# Patient Record
Sex: MALE | Race: WHITE | NOT HISPANIC OR LATINO | Employment: FULL TIME | URBAN - METROPOLITAN AREA
[De-identification: names, ages, dates, MRNs, and addresses within clinical notes are randomized per-mention and may not be internally consistent; named-entity substitution may affect disease eponyms.]

---

## 2021-04-14 DIAGNOSIS — Z23 ENCOUNTER FOR IMMUNIZATION: ICD-10-CM

## 2023-11-08 ENCOUNTER — OFFICE VISIT (OUTPATIENT)
Dept: FAMILY MEDICINE CLINIC | Facility: CLINIC | Age: 43
End: 2023-11-08
Payer: COMMERCIAL

## 2023-11-08 VITALS
HEART RATE: 71 BPM | TEMPERATURE: 99.1 F | HEIGHT: 71 IN | DIASTOLIC BLOOD PRESSURE: 80 MMHG | WEIGHT: 233 LBS | OXYGEN SATURATION: 96 % | SYSTOLIC BLOOD PRESSURE: 110 MMHG | BODY MASS INDEX: 32.62 KG/M2

## 2023-11-08 DIAGNOSIS — Z00.00 WELL ADULT EXAM: Primary | ICD-10-CM

## 2023-11-08 DIAGNOSIS — Z13.0 SCREENING FOR DEFICIENCY ANEMIA: ICD-10-CM

## 2023-11-08 DIAGNOSIS — R09.81 CHRONIC NASAL CONGESTION: ICD-10-CM

## 2023-11-08 DIAGNOSIS — J45.20 MILD INTERMITTENT ASTHMA WITHOUT COMPLICATION: ICD-10-CM

## 2023-11-08 DIAGNOSIS — L65.9 HAIR LOSS: ICD-10-CM

## 2023-11-08 DIAGNOSIS — Z13.228 SCREENING FOR ENDOCRINE, METABOLIC AND IMMUNITY DISORDER: ICD-10-CM

## 2023-11-08 DIAGNOSIS — T78.40XA ALLERGY, INITIAL ENCOUNTER: ICD-10-CM

## 2023-11-08 DIAGNOSIS — Z13.29 SCREENING FOR ENDOCRINE, METABOLIC AND IMMUNITY DISORDER: ICD-10-CM

## 2023-11-08 DIAGNOSIS — Z13.1 SCREENING FOR DIABETES MELLITUS (DM): ICD-10-CM

## 2023-11-08 DIAGNOSIS — Z13.0 SCREENING FOR ENDOCRINE, METABOLIC AND IMMUNITY DISORDER: ICD-10-CM

## 2023-11-08 DIAGNOSIS — Z13.220 SCREENING FOR LIPID DISORDERS: ICD-10-CM

## 2023-11-08 DIAGNOSIS — Z11.59 NEED FOR HEPATITIS C SCREENING TEST: ICD-10-CM

## 2023-11-08 PROCEDURE — 99203 OFFICE O/P NEW LOW 30 MIN: CPT | Performed by: FAMILY MEDICINE

## 2023-11-08 PROCEDURE — 99386 PREV VISIT NEW AGE 40-64: CPT | Performed by: FAMILY MEDICINE

## 2023-11-08 RX ORDER — FINASTERIDE 1 MG/1
1 TABLET, FILM COATED ORAL DAILY
Qty: 90 TABLET | Refills: 1 | Status: SHIPPED | OUTPATIENT
Start: 2023-11-08

## 2023-11-08 RX ORDER — ALBUTEROL SULFATE 90 UG/1
2 AEROSOL, METERED RESPIRATORY (INHALATION) EVERY 6 HOURS PRN
Qty: 6.7 G | Refills: 1 | Status: SHIPPED | OUTPATIENT
Start: 2023-11-08

## 2023-11-08 NOTE — PROGRESS NOTES
[address]    NAME: Chica Collazo  AGE: 44 y.o. SEX: male  : 1980     DATE: 12/3/2023     Assessment and Plan:     Problem List Items Addressed This Visit          Respiratory    Mild intermittent asthma without complication     Renewal of albuterol          Relevant Medications    albuterol (Proventil HFA) 90 mcg/act inhaler       Other    Allergies    Relevant Orders    Respiratory Allergy Profile Region I (Completed)    Chronic nasal congestion     Ent evaluation. Discussed stopping afrin- rebound congestion contributing to problem. Continue with zyrtec, sudafed and flonase         Relevant Orders    Ambulatory Referral to Otolaryngology    Well adult exam - Primary    Hair loss     Discussed treatment options. Propecia 1 tab daily          Relevant Medications    finasteride (PROPECIA) 1 MG tablet    Other Relevant Orders    Testosterone (Completed)     Other Visit Diagnoses       Screening for deficiency anemia        Relevant Orders    CBC and differential (Completed)    Screening for diabetes mellitus (DM)        Relevant Orders    Comprehensive metabolic panel (Completed)    Screening for endocrine, metabolic and immunity disorder        Relevant Orders    Comprehensive metabolic panel (Completed)    TSH, 3rd generation with Free T4 reflex (Completed)    Screening for lipid disorders        Relevant Orders    Lipid Panel with Direct LDL reflex (Completed)    Need for hepatitis C screening test        Relevant Orders    Hepatitis C Qualitative by PCR ([facility] ONLY)            Immunizations and preventive care screenings were discussed with patient today. Appropriate education was printed on patient's after visit summary. Counseling:  Dental Health: discussed importance of regular tooth brushing, flossing, and dental visits. Exercise: the importance of regular exercise/physical activity was discussed.  Recommend exercise 3-5 times per week for at least 30 minutes. BMI Counseling: Body mass index is 32.5 kg/m². The BMI is above normal. Nutrition recommendations include decreasing portion sizes. Exercise recommendations include exercising 3-5 times per week. No pharmacotherapy was ordered. Rationale for BMI follow-up plan is due to patient being overweight or obese. Depression Screening and Follow-up Plan: Patient was screened for depression during today's encounter. They screened negative with a PHQ-2 score of 0. No follow-ups on file. Chief Complaint:     Chief Complaint   Patient presents with    [facility] [place] [facility]     Np- physical check up   Pend bw   Hair loss and weight loss   Allergies and sinus congested always has sinus issues and nose been stuffy and sinus pressure been taken allergy meds zyrtec been using sudafed wake  up stuffy nose when he runs he bleeds out his mouth,   Renew his inhaler        History of Present Illness:     Adult Annual Physical   Patient here for a comprehensive physical exam. The patient reports problems - chronic nasal congestion, ongoing knee pain . Diet and Physical Activity  Diet/Nutrition: well balanced diet. Exercise: walking. Depression Screening  PHQ-2/9 Depression Screening    Little interest or pleasure in doing things: 0 - not at all  Feeling down, depressed, or hopeless: 0 - not at all  PHQ-2 Score: 0  PHQ-2 Interpretation: Negative depression screen       General Health  Sleep: sleeps well. Hearing: decreased - bilateral.  Vision: wears glasses. Dental: regular dental visits.  Health  Symptoms include: none    Advanced Care Planning  Do you have an advanced directive? no  Do you have a durable medical power of ? no     Review of Systems:     Review of Systems   Constitutional: Negative. Negative for fatigue and fever. HENT:  Positive for congestion. Eyes: Negative. Respiratory: Negative. Negative for cough. Cardiovascular: Negative. Gastrointestinal: Negative. Endocrine: Negative. Genitourinary: Negative. Musculoskeletal: Negative. Skin: Negative. Allergic/Immunologic: Negative. Neurological: Negative. Psychiatric/Behavioral: Negative. Past Medical History:     Past Medical History:   Diagnosis Date    Allergic     Asthma       Past Surgical History:     Past Surgical History:   Procedure Laterality Date    ELBOW SURGERY        Family History:     Family History   Problem Relation Age of Onset    Bipolar disorder Mother     Hyperlipidemia Father       Social History:     Social History     Socioeconomic History    Marital status: /Civil Union     Spouse name: None    Number of children: None    Years of education: None    Highest education level: None   Occupational History    None   Tobacco Use    Smoking status: Former     Types: Cigarettes     Quit date: 2008     Years since quitting: 15.9    Smokeless tobacco: Never   Vaping Use    Vaping Use: None   Substance and Sexual Activity    Alcohol use: Not Currently    Drug use: Not Currently    Sexual activity: None   Other Topics Concern    None   Social History Narrative    None     Social Determinants of Health     Financial Resource Strain: Not on file   Food Insecurity: Not on file   Transportation Needs: Not on file   Physical Activity: Not on file   Stress: Not on file   Social Connections: Not on file   Intimate Partner Violence: Not on file   Housing Stability: Not on file      Current Medications:     Current Outpatient Medications   Medication Sig Dispense Refill    albuterol (Proventil HFA) 90 mcg/act inhaler Inhale 2 puffs every 6 (six) hours as needed for wheezing 6.7 g 1    finasteride (PROPECIA) 1 MG tablet Take 1 tablet (1 mg total) by mouth daily 90 tablet 1    Mometasone Furoate POWD Dissolve 1 mg in [employer] Sinus rinse bottle and irrigate sinuses twice daily.  90 day supply 0.18 g 3    oxymetazoline (AFRIN) 0.05 % nasal spray 2 sprays by Each Nare route 2 (two) times a day PRN       No current facility-administered medications for this visit. Allergies: Allergies   Allergen Reactions    Mixed Ragweed Allergic Rhinitis    Molds & Smuts Allergic Rhinitis    Seasonal Ic [Octacosanol] Sneezing      Physical Exam:     /80   Pulse 71   Temp 99.1 °F (37.3 °C) (Tympanic)   Ht 5' 11" (1.803 m)   Wt 106 kg (233 lb)   SpO2 96%   BMI 32.50 kg/m²     Physical Exam  Vitals and nursing note reviewed. Constitutional:       Appearance: He is well-developed. HENT:      Head: Normocephalic. Ears:      Comments: Cerumen bilaterally     Nose: Congestion present. Eyes:      Conjunctiva/sclera: Conjunctivae normal.      Pupils: Pupils are equal, round, and reactive to light. Cardiovascular:      Rate and Rhythm: Normal rate and regular rhythm. Pulmonary:      Effort: Pulmonary effort is normal.      Breath sounds: Normal breath sounds. Abdominal:      General: Bowel sounds are normal.      Palpations: Abdomen is soft. Musculoskeletal:         General: Normal range of motion. Cervical back: Normal range of motion and neck supple. Skin:     General: Skin is warm and dry. Neurological:      Mental Status: He is alert and oriented to person, place, and time. Psychiatric:         Behavior: Behavior normal.         Thought Content:  Thought content normal.         Judgment: Judgment normal.          Mallissa Severe, DO  [address]

## 2023-11-08 NOTE — PATIENT INSTRUCTIONS
Fasting blood work at [facility] 1 tab daily for hair loss  Ent evaluation for chronic congestion     Wellness Visit for Adults   AMBULATORY CARE:   A wellness visit  is when you see your healthcare provider to get screened for health problems. Your healthcare provider will also give you advice on how to stay healthy. Write down your questions so you remember to ask them. Ask your healthcare provider how often you should have a wellness visit. What happens at a wellness visit:  Your healthcare provider will ask about your health, and your family history of health problems. This includes high blood pressure, heart disease, and cancer. He or she will ask if you have symptoms that concern you, if you smoke, and about your mood. You may also be asked about your intake of medicines, supplements, food, and alcohol. Any of the following may be done: Your weight  will be checked. Your height may also be checked so your body mass index (BMI) can be calculated. Your BMI shows if you are at a healthy weight. Your blood pressure  and heart rate will be checked. Your temperature may also be checked. Blood and urine tests  may be done. Blood tests may be done to check your cholesterol levels. Abnormal cholesterol levels increase your risk for heart disease and stroke. You may also need a blood or urine test to check for diabetes if you are at increased risk. Urine tests may be done to look for signs of an infection or kidney disease. A physical exam  includes checking your heartbeat and lungs with a stethoscope. Your healthcare provider may also check your skin to look for sun damage. Screening tests  may be recommended. A screening test is done to check for diseases that may not cause symptoms. The screening tests you may need depend on your age, gender, family history, and lifestyle habits. For example, colorectal screening may be recommended if you are 48years old or older.     Screening tests you need if you are a woman:   A Pap smear  is used to screen for cervical cancer. Pap smears are usually done every 3 to 5 years depending on your age. You may need them more often if you have had abnormal Pap smear test results in the past. Ask your healthcare provider how often you should have a Pap smear. A mammogram  is an x-ray of your breasts to screen for breast cancer. Experts recommend mammograms every 2 years starting at age 48 years. You may need a mammogram at age 52 years or younger if you have an increased risk for breast cancer. Talk to your healthcare provider about when you should start having mammograms and how often you need them. Vaccines you may need:   Get an influenza vaccine  every year. The influenza vaccine protects you from the flu. Several types of viruses cause the flu. The viruses change over time, so new vaccines are made each year. Get a tetanus-diphtheria (Td) booster vaccine  every 10 years. This vaccine protects you against tetanus and diphtheria. Tetanus is a severe infection that may cause painful muscle spasms and lockjaw. Diphtheria is a severe bacterial infection that causes a thick covering in the back of your mouth and throat. Get a human papillomavirus (HPV) vaccine  if you are female and aged 23 to 32 or male 23 to 24 and never received it. This vaccine protects you from HPV infection. HPV is the most common infection spread by sexual contact. HPV may also cause vaginal, penile, and anal cancers. Get a pneumococcal vaccine  if you are aged 72 years or older. The pneumococcal vaccine is an injection given to protect you from pneumococcal disease. Pneumococcal disease is an infection caused by pneumococcal bacteria. The infection may cause pneumonia, meningitis, or an ear infection. Get a shingles vaccine  if you are 60 or older, even if you have had shingles before. The shingles vaccine is an injection to protect you from the varicella-zoster virus.  This is the same virus that causes chickenpox. Shingles is a painful rash that develops in people who had chickenpox or have been exposed to the virus. How to eat healthy:  My Plate is a model for planning healthy meals. It shows the types and amounts of foods that should go on your plate. Fruits and vegetables make up about half of your plate, and grains and protein make up the other half. A serving of dairy is included on the side of your plate. The amount of calories and serving sizes you need depends on your age, gender, weight, and height. Examples of healthy foods are listed below:  Eat a variety of vegetables  such as dark green, red, and orange vegetables. You can also include canned vegetables low in sodium (salt) and frozen vegetables without added butter or sauces. Eat a variety of fresh fruits , canned fruit in 100% juice, frozen fruit, and dried fruit. Include whole grains. At least half of the grains you eat should be whole grains. Examples include whole-wheat bread, wheat pasta, brown rice, and whole-grain cereals such as oatmeal.    Eat a variety of protein foods such as seafood (fish and shellfish), lean meat, and poultry without skin (turkey and chicken). Examples of lean meats include pork leg, shoulder, or tenderloin, and beef round, sirloin, tenderloin, and extra lean ground beef. Other protein foods include eggs and egg substitutes, beans, peas, soy products, nuts, and seeds. Choose low-fat dairy products such as skim or 1% milk or low-fat yogurt, cheese, and cottage cheese. Limit unhealthy fats  such as butter, hard margarine, and shortening. Exercise:  Exercise at least 30 minutes per day on most days of the week. Some examples of exercise include walking, biking, dancing, and swimming. You can also fit in more physical activity by taking the stairs instead of the elevator or parking farther away from stores. Include muscle strengthening activities 2 days each week.  Regular exercise provides many health benefits. It helps you manage your weight, and decreases your risk for type 2 diabetes, heart disease, stroke, and high blood pressure. Exercise can also help improve your mood. Ask your healthcare provider about the best exercise plan for you. General health and safety guidelines:   Do not smoke. Nicotine and other chemicals in cigarettes and cigars can cause lung damage. Ask your healthcare provider for information if you currently smoke and need help to quit. E-cigarettes or smokeless tobacco still contain nicotine. Talk to your healthcare provider before you use these products. Limit alcohol. A drink of alcohol is 12 ounces of beer, 5 ounces of wine, or 1½ ounces of liquor. Lose weight, if needed. Being overweight increases your risk of certain health conditions. These include heart disease, high blood pressure, type 2 diabetes, and certain types of cancer. Protect your skin. Do not sunbathe or use tanning beds. Use sunscreen with a SPF 15 or higher. Apply sunscreen at least 15 minutes before you go outside. Reapply sunscreen every 2 hours. Wear protective clothing, hats, and sunglasses when you are outside. Drive safely. Always wear your seatbelt. Make sure everyone in your car wears a seatbelt. A seatbelt can save your life if you are in an accident. Do not use your cell phone when you are driving. This could distract you and cause an accident. Pull over if you need to make a call or send a text message. Practice safe sex. Use latex condoms if are sexually active and have more than one partner. Your healthcare provider may recommend screening tests for sexually transmitted infections (STIs). Wear helmets, lifejackets, and protective gear. Always wear a helmet when you ride a bike or motorcycle, go skiing, or play sports that could cause a head injury. Wear protective equipment when you play sports.  Wear a lifejacket when you are on a boat or doing water sports. © Copyright Kimber Mendoza 2023 Information is for End User's use only and may not be sold, redistributed or otherwise used for commercial purposes. The above information is an  only. It is not intended as medical advice for individual conditions or treatments. Talk to your doctor, nurse or pharmacist before following any medical regimen to see if it is safe and effective for you.

## 2023-11-08 NOTE — PROGRESS NOTES
Assessment/Plan:      1. Well adult exam    2. Chronic nasal congestion  Assessment & Plan:  Ent evaluation. Discussed stopping afrin- rebound congestion contributing to problem. Continue with zyrtec, sudafed and flonase    Orders:  -     Ambulatory Referral to Otolaryngology; Future    3. Allergy, initial encounter  -     Respiratory Allergy Profile Region I; Future  -     Respiratory Allergy Profile Region I    4. Mild intermittent asthma without complication  Assessment & Plan:  Renewal of albuterol     Orders:  -     albuterol (Proventil HFA) 90 mcg/act inhaler; Inhale 2 puffs every 6 (six) hours as needed for wheezing    5. Hair loss  Assessment & Plan:  Discussed treatment options. Propecia 1 tab daily     Orders:  -     Testosterone; Future  -     finasteride (PROPECIA) 1 MG tablet; Take 1 tablet (1 mg total) by mouth daily  -     Testosterone    6. Screening for deficiency anemia  -     CBC and differential; Future  -     CBC and differential    7. Screening for diabetes mellitus (DM)  -     Comprehensive metabolic panel; Future  -     Comprehensive metabolic panel    8. Screening for endocrine, metabolic and immunity disorder  -     Comprehensive metabolic panel; Future  -     TSH, 3rd generation with Free T4 reflex; Future  -     Comprehensive metabolic panel  -     TSH, 3rd generation with Free T4 reflex    9. Screening for lipid disorders  -     Lipid Panel with Direct LDL reflex; Future  -     Lipid Panel with Direct LDL reflex    10. Need for hepatitis C screening test  -     Hepatitis C Qualitative by PCR ([facility] ONLY);  Future  -     Hepatitis C Qualitative by PCR ([facility] ONLY)          Subjective:  Chief Complaint   Patient presents with    Establish Care     Np- physical check up   Pend bw   Hair loss and weight loss   Allergies and sinus congested always has sinus issues and nose been stuffy and sinus pressure been taken allergy meds zyrtec been using sudafed wake  up stuffy nose when he runs he bleeds out his mouth,   Renew his inhaler          Patient ID: Cata Chance is a 44 y.o. male. Pt here to establish in our office. Pt Complains of stuffy nose for the past year. Taking zyrtec and sudafed  Notices when Eating pizza or drinking beer, gets nasal congestion. Uses afrin at bedtime . If he does not use, very congested and gets migraine in the am   Complains of pain over Patellar tendon, superior pain, has been to physical therapy and ortho. Eating a Plant based diet and exercising regularly- running. Complains of hair loss and weight loss        Review of Systems   Constitutional: Negative. Negative for fatigue and fever. HENT:  Positive for congestion. Eyes: Negative. Respiratory: Negative. Negative for cough. Cardiovascular: Negative. Gastrointestinal: Negative. Endocrine: Negative. Genitourinary: Negative. Musculoskeletal:  Positive for arthralgias. Skin:         Hair loss   Allergic/Immunologic: Negative. Neurological: Negative. Psychiatric/Behavioral: Negative. The following portions of the patient's history were reviewed and updated as appropriate: allergies, current medications, past family history, past medical history, past social history, past surgical history and problem list.    Objective:  Vitals:    11/08/23 1533   BP: 110/80   Pulse: 71   Temp: 99.1 °F (37.3 °C)   TempSrc: Tympanic   SpO2: 96%   Weight: 106 kg (233 lb)   Height: 5' 11" (1.803 m)      Physical Exam  Vitals and nursing note reviewed. Constitutional:       Appearance: He is well-developed. HENT:      Head: Normocephalic and atraumatic. Ears:      Comments: Cerumen bilateral  Cardiovascular:      Rate and Rhythm: Normal rate and regular rhythm. Heart sounds: Normal heart sounds. Pulmonary:      Effort: Pulmonary effort is normal.      Breath sounds: Normal breath sounds. Abdominal:      General: Bowel sounds are normal.      Palpations: Abdomen is soft. Musculoskeletal:         General: Signs of injury present. Skin:     General: Skin is warm and dry. Neurological:      Mental Status: He is alert and oriented to person, place, and time. Psychiatric:         Behavior: Behavior normal.         Thought Content:  Thought content normal.         Judgment: Judgment normal.

## 2023-11-22 LAB
A ALTERNATA IGE QN: 0.85 KU/L
A FUMIGATUS IGE QN: <0.1 KU/L
ALBUMIN SERPL-MCNC: 4.6 G/DL (ref 3.6–5.1)
ALBUMIN/GLOB SERPL: 1.7 (CALC) (ref 1–2.5)
ALP SERPL-CCNC: 68 U/L (ref 36–130)
ALT SERPL-CCNC: 20 U/L (ref 9–46)
AST SERPL-CCNC: 20 U/L (ref 10–40)
BASOPHILS # BLD AUTO: 77 CELLS/UL (ref 0–200)
BASOPHILS NFR BLD AUTO: 0.9 %
BERMUDA GRASS IGE QN: <0.1 KU/L
BILIRUB SERPL-MCNC: 0.3 MG/DL (ref 0.2–1.2)
BOXELDER IGE QN: <0.1 KU/L
BUN SERPL-MCNC: 10 MG/DL (ref 7–25)
BUN/CREAT SERPL: NORMAL (CALC) (ref 6–22)
C HERBARUM IGE QN: <0.1 KU/L
CALCIUM SERPL-MCNC: 9.6 MG/DL (ref 8.6–10.3)
CALIF WALNUT POLN IGE QN: <0.1 KU/L
CAT DANDER IGE QN: <0.1 KU/L
CHLORIDE SERPL-SCNC: 103 MMOL/L (ref 98–110)
CHOLEST SERPL-MCNC: 239 MG/DL
CHOLEST/HDLC SERPL: 6.1 (CALC)
CMN PIGWEED IGE QN: <0.1 KU/L
CO2 SERPL-SCNC: 25 MMOL/L (ref 20–32)
COMMON RAGWEED IGE QN: 2.23 KU/L
COTTONWOOD IGE QN: <0.1 KU/L
CREAT SERPL-MCNC: 1.01 MG/DL (ref 0.6–1.29)
D FARINAE IGE QN: <0.1 KU/L
D PTERONYSS IGE QN: <0.1 KU/L
DEPRECATED A ALTERNATA IGE RAST QL: 2
DEPRECATED A FUMIGATUS IGE RAST QL: 0
DEPRECATED BERMUDA GRASS IGE RAST QL: 0
DEPRECATED BOXELDER IGE RAST QL: 0
DEPRECATED C HERBARUM IGE RAST QL: 0
DEPRECATED CALIF WALNUT POLN IGE RAST QL: 0
DEPRECATED CAT DANDER IGE RAST QL: 0
DEPRECATED COMMON PIGWEED IGE RAST QL: 0
DEPRECATED COMMON RAGWEED IGE RAST QL: 2
DEPRECATED COTTONWOOD IGE RAST QL: 0
DEPRECATED D FARINAE IGE RAST QL: 0
DEPRECATED D PTERONYSS IGE RAST QL: 0
DEPRECATED DOG DANDER IGE RAST QL: 0
DEPRECATED LONDON PLANE IGE RAST QL: 0
DEPRECATED MOUSE URINE PROT IGE RAST QL: 0
DEPRECATED MT JUNIPER IGE RAST QL: 0
DEPRECATED MUGWORT IGE RAST QL: 0
DEPRECATED P NOTATUM IGE RAST QL: 0
DEPRECATED ROACH IGE RAST QL: ABNORMAL
DEPRECATED SHEEP SORREL IGE RAST QL: 0
DEPRECATED SILVER BIRCH IGE RAST QL: 0
DEPRECATED TIMOTHY IGE RAST QL: 0
DEPRECATED WHITE ASH IGE RAST QL: 0
DEPRECATED WHITE ELM IGE RAST QL: 0
DEPRECATED WHITE MULBERRY IGE RAST QL: 0
DEPRECATED WHITE OAK IGE RAST QL: 0
DOG DANDER IGE QN: <0.1 KU/L
EOSINOPHIL # BLD AUTO: 421 CELLS/UL (ref 15–500)
EOSINOPHIL NFR BLD AUTO: 4.9 %
ERYTHROCYTE [DISTWIDTH] IN BLOOD BY AUTOMATED COUNT: 12.5 % (ref 11–15)
GFR/BSA.PRED SERPLBLD CYS-BASED-ARV: 95 ML/MIN/1.73M2
GLOBULIN SER CALC-MCNC: 2.7 G/DL (CALC) (ref 1.9–3.7)
GLUCOSE SERPL-MCNC: 92 MG/DL (ref 65–99)
HCT VFR BLD AUTO: 40.4 % (ref 38.5–50)
HCV RNA SERPL NAA+PROBE-ACNC: NORMAL IU/ML
HCV RNA SERPL NAA+PROBE-LOG IU: NORMAL LOG IU/ML
HDLC SERPL-MCNC: 39 MG/DL
HGB BLD-MCNC: 13.8 G/DL (ref 13.2–17.1)
IGE SERPL-ACNC: 92 KU/L
LDLC SERPL DIRECT ASSAY-MCNC: 143 MG/DL
LONDON PLANE IGE QN: <0.1 KU/L
LYMPHOCYTES # BLD AUTO: 3113 CELLS/UL (ref 850–3900)
LYMPHOCYTES NFR BLD AUTO: 36.2 %
MCH RBC QN AUTO: 30.6 PG (ref 27–33)
MCHC RBC AUTO-ENTMCNC: 34.2 G/DL (ref 32–36)
MCV RBC AUTO: 89.6 FL (ref 80–100)
MONOCYTES # BLD AUTO: 826 CELLS/UL (ref 200–950)
MONOCYTES NFR BLD AUTO: 9.6 %
MOUSE URINE PROT IGE QN: <0.1 KU/L
MT JUNIPER IGE QN: <0.1 KU/L
MUGWORT IGE QN: <0.1 KU/L
NEUTROPHILS # BLD AUTO: 4162 CELLS/UL (ref 1500–7800)
NEUTROPHILS NFR BLD AUTO: 48.4 %
NONHDLC SERPL-MCNC: 200 MG/DL (CALC)
P NOTATUM IGE QN: <0.1 KU/L
PLATELET # BLD AUTO: 318 THOUSAND/UL (ref 140–400)
PMV BLD REES-ECKER: 9 FL (ref 7.5–12.5)
POTASSIUM SERPL-SCNC: 4.4 MMOL/L (ref 3.5–5.3)
PROT SERPL-MCNC: 7.3 G/DL (ref 6.1–8.1)
RBC # BLD AUTO: 4.51 MILLION/UL (ref 4.2–5.8)
ROACH IGE QN: 0.1 KU/L
SHEEP SORREL IGE QN: <0.1 KU/L
SILVER BIRCH IGE QN: <0.1 KU/L
SODIUM SERPL-SCNC: 140 MMOL/L (ref 135–146)
TESTOST SERPL-MCNC: 181 NG/DL (ref 250–827)
TIMOTHY IGE QN: <0.1 KU/L
TRIGL SERPL-MCNC: 464 MG/DL
TSH SERPL-ACNC: 2.05 MIU/L (ref 0.4–4.5)
WBC # BLD AUTO: 8.6 THOUSAND/UL (ref 3.8–10.8)
WHITE ASH IGE QN: <0.1 KU/L
WHITE ELM IGE QN: <0.1 KU/L
WHITE MULBERRY IGE QN: <0.1 KU/L
WHITE OAK IGE QN: <0.1 KU/L

## 2023-11-24 ENCOUNTER — TELEPHONE (OUTPATIENT)
Dept: FAMILY MEDICINE CLINIC | Facility: CLINIC | Age: 43
End: 2023-11-24

## 2023-11-24 DIAGNOSIS — E29.1 HYPOTESTOSTERONEMIA IN MALE: Primary | ICD-10-CM

## 2023-11-24 NOTE — TELEPHONE ENCOUNTER
----- Message from Riana Arora DO sent at 11/24/2023  7:38 AM EST -----  Your cholesterol level is too high- if there are dietary changes you can make, we can recheck in 3-6 months. Increased activity helps as well. Your triglycerides are high as well so watch sugars and carbs. Your testosterone level is low so a more specific test is recommended to evaluate further. I will put the order in for the lab and you can get it any time. Your other tests are normal, sugar, liver and kidney function. Your thyroid and blood count are normal.  Your allergy test shows a very high allergy to ragweed (fall) and moderate to mold.
Patient aware on results, saw mychart message from provider to them on Mychart   Your cholesterol level is too high- if there are dietary changes you can make, we can recheck in 3-6 months. Increased activity helps as well. Your triglycerides are high as well so watch sugars and carbs. Your testosterone level is low so a more specific test is recommended to evaluate further. I will put the order in for the lab and you can get it any time. Your other tests are normal, sugar, liver and kidney function. Your thyroid and blood count are normal.  Your allergy test shows a very high allergy to ragweed (fall) and moderate to mold.    Written by Celene Gowers, DO on 11/24/2023  7:38 AM EST  Seen by patient Rosendo Ra on 11/24/2023  8:02 AM
Normal vision: sees adequately in most situations; can see medication labels, newsprint

## 2023-12-01 LAB — TESTOST SERPL-MCNC: 248 NG/DL (ref 250–1100)

## 2023-12-03 PROBLEM — T78.40XA ALLERGIES: Status: ACTIVE | Noted: 2023-12-03

## 2023-12-03 PROBLEM — Z00.00 WELL ADULT EXAM: Status: ACTIVE | Noted: 2023-12-03

## 2023-12-03 PROBLEM — L65.9 HAIR LOSS: Status: ACTIVE | Noted: 2023-12-03

## 2023-12-03 PROBLEM — R09.81 CHRONIC NASAL CONGESTION: Status: ACTIVE | Noted: 2023-12-03

## 2023-12-03 PROBLEM — J45.20 MILD INTERMITTENT ASTHMA WITHOUT COMPLICATION: Status: ACTIVE | Noted: 2023-12-03

## 2023-12-04 ENCOUNTER — OFFICE VISIT (OUTPATIENT)
Dept: FAMILY MEDICINE CLINIC | Facility: CLINIC | Age: 43
End: 2023-12-04
Payer: COMMERCIAL

## 2023-12-04 VITALS
HEIGHT: 71 IN | DIASTOLIC BLOOD PRESSURE: 62 MMHG | TEMPERATURE: 98 F | WEIGHT: 233 LBS | OXYGEN SATURATION: 98 % | SYSTOLIC BLOOD PRESSURE: 102 MMHG | BODY MASS INDEX: 32.62 KG/M2 | HEART RATE: 79 BPM

## 2023-12-04 DIAGNOSIS — L65.9 HAIR LOSS: ICD-10-CM

## 2023-12-04 DIAGNOSIS — R09.81 CHRONIC NASAL CONGESTION: ICD-10-CM

## 2023-12-04 DIAGNOSIS — E78.49 OTHER HYPERLIPIDEMIA: ICD-10-CM

## 2023-12-04 DIAGNOSIS — E29.1 HYPOTESTOSTERONEMIA IN MALE: Primary | ICD-10-CM

## 2023-12-04 DIAGNOSIS — T78.40XD ALLERGY, SUBSEQUENT ENCOUNTER: ICD-10-CM

## 2023-12-04 PROCEDURE — 99214 OFFICE O/P EST MOD 30 MIN: CPT | Performed by: FAMILY MEDICINE

## 2023-12-04 NOTE — PATIENT INSTRUCTIONS
Repeat cholesterol and testosterone level     Cholesterol and Your Health   AMBULATORY CARE:   Cholesterol  is a waxy, fat-like substance. Your body uses cholesterol to make hormones and new cells, and to protect nerves. Cholesterol is made by your body. It also comes from certain foods you eat, such as meat and dairy products. Your healthcare provider can help you set goals for your cholesterol levels. Your provider can help you create a plan to meet your goals. Cholesterol level goals: Your cholesterol level goals depend on your risk for heart disease, your age, and your other health conditions. The following are general guidelines: Total cholesterol  includes low-density lipoprotein (LDL), high-density lipoprotein (HDL), and triglyceride levels. The total cholesterol level should be lower than 200 mg/dL and is best at about 150 mg/dL. LDL cholesterol  is called bad cholesterol  because it forms plaque in your arteries. As plaque builds up, your arteries become narrow, and less blood flows through. When plaque decreases blood flow to your heart, you may have chest pain. If plaque completely blocks an artery that brings blood to your heart, you may have a heart attack. Plaque can break off and form blood clots. Blood clots may block arteries in your brain and cause a stroke. The level should be less than 130 mg/dL and is best at about 100 mg/dL. HDL cholesterol  is called good cholesterol  because it helps remove LDL cholesterol from your arteries. It does this by attaching to LDL cholesterol and carrying it to your liver. Your liver breaks down LDL cholesterol so your body can get rid of it. High levels of HDL cholesterol can help prevent a heart attack and stroke. Low levels of HDL cholesterol can increase your risk for heart disease, heart attack, and stroke. The level should be at least 40 mg/dL in males or at least 50 mg/dL in females.     Triglycerides  are a type of fat that store energy from foods you eat. High levels of triglycerides also cause plaque buildup. This can increase your risk for a heart attack or stroke. If your triglyceride level is high, your LDL cholesterol level may also be high. The level should be less than 150 mg/dL. Any of the following can increase your risk for high cholesterol:   Smoking or drinking large amounts of alcohol    Having overweight or obesity, or not getting enough exercise    A medical condition such as hypertension (high blood pressure) or diabetes    A family history of high cholesterol    Age older than 72    What you need to know about having your cholesterol levels checked: Adults 21to 39years of age should have their cholesterol levels checked every 4 to 6 years. Adults 45 years or older should have their cholesterol checked every 1 to 2 years. You may need your cholesterol checked more often, or at a younger age, if you have risk factors for heart disease. You may also need to have your cholesterol checked more often if you have other health conditions, such as diabetes. Blood tests are used to check cholesterol levels. Blood tests measure your levels of triglycerides, LDL cholesterol, and HDL cholesterol. How healthy fats affect your cholesterol levels:  Healthy fats, also called unsaturated fats, help lower LDL cholesterol and triglyceride levels. Healthy fats include the following:  Monounsaturated fats  are found in foods such as olive oil, canola oil, avocado, nuts, and olives. Polyunsaturated fats,  such as omega 3 fats, are found in fish, such as salmon, trout, and tuna. They can also be found in plant foods such as flaxseed, walnuts, and soybeans. How unhealthy fats affect your cholesterol levels:  Unhealthy fats increase LDL cholesterol and triglyceride levels. They are found in foods high in cholesterol, saturated fat, and trans fat:  Cholesterol  is found in eggs, dairy, and meat.     Saturated fat  is found in butter, cheese, ice cream, whole milk, and coconut oil. Saturated fat is also found in meat, such as sausage, hot dogs, and bologna. Trans fat  is found in liquid oils and is used in fried and baked foods. Foods that contain trans fats include chips, crackers, muffins, sweet rolls, microwave popcorn, and cookies. Treatment  for high cholesterol will also decrease your risk of heart disease, heart attack, and stroke. Treatment may include any of the following:  Lifestyle changes  may include food, exercise, weight loss, and quitting smoking. You may also need to decrease the amount of alcohol you drink. Your healthcare provider will want you to start with lifestyle changes. Other treatment may be added if lifestyle changes are not enough. Your healthcare provider may recommend you work with a team to manage hyperlipidemia. The team may include medical experts such as a dietitian, an exercise or physical therapist, and a behavior therapist. Your family members may be included in helping you create lifestyle changes. Medicines  may be given to lower your LDL cholesterol, triglyceride levels, or total cholesterol level. You may need medicines to lower your cholesterol if any of the following is true:    You have a history of stroke, TIA, unstable angina, or a heart attack. Your LDL cholesterol level is 190 mg/dL or higher. You are age 36 to 76 years, have diabetes or heart disease risk factors, and your LDL cholesterol is 70 mg/dL or higher. Supplements  include fish oil, red yeast rice, and garlic. Fish oil may help lower your triglyceride and LDL cholesterol levels. It may also increase your HDL cholesterol level. Red yeast rice may help decrease your total cholesterol level and LDL cholesterol level. Garlic may help lower your total cholesterol level. Do not take any supplements without talking to your healthcare provider.     Food changes you can make to lower your cholesterol levels:  A dietitian can help you create a healthy eating plan. Your dietitian can show you how to read food labels and choose foods low in saturated fat, trans fats, and cholesterol. Decrease the total amount of fat you eat. Choose lean meats, fat-free or 1% fat milk, and low-fat dairy products, such as yogurt and cheese. Try to limit or avoid red meats. Limit or do not eat fried foods or baked goods, such as cookies. Replace unhealthy fats with healthy fats. Cook foods in olive oil or canola oil. Choose soft margarines that are low in saturated fat and trans fat. Seeds, nuts, and avocados are other examples of healthy fats. Eat foods with omega-3 fats. Examples include salmon, tuna, mackerel, walnuts, and flaxseed. Eat fish 2 times per week. Pregnant women should not eat fish that have high levels of mercury, such as shark, swordfish, and michael mackerel. Increase the amount of high-fiber foods you eat. High-fiber foods can help lower your LDL cholesterol. Aim to get between 20 and 30 grams of fiber each day. Fruits and vegetables are high in fiber. Eat at least 5 servings each day. Other high-fiber foods are whole-grain or whole-wheat breads, pastas, or cereals, and brown rice. Eat 3 ounces of whole-grain foods each day. Increase fiber slowly. You may have abdominal discomfort, bloating, and gas if you add fiber to your diet too quickly. Eat healthy protein foods. Examples include low-fat dairy products, skinless chicken and turkey, fish, and nuts. Limit foods and drinks that are high in sugar. Your dietitian or healthcare provider can help you create daily limits for high-sugar foods and drinks. The limit may be lower if you have diabetes or another health condition. Limits can also help you lose weight if needed. Lifestyle changes you can make to lower your cholesterol levels:   Maintain a healthy weight. Ask your healthcare provider what a healthy weight is for you.  Ask your provider to help you create a weight loss plan if needed. Weight loss can decrease your total cholesterol and triglyceride levels. Weight loss may also help keep your blood pressure at a healthy level. Be physically active throughout the day. Physical activity, such as exercise, can help lower your total cholesterol level and maintain a healthy weight. Physical activity can also help increase your HDL cholesterol level. Work with your healthcare provider to create an program that is right for you. Get at least 30 to 40 minutes of moderate physical activity most days of the week. Examples of exercise include brisk walking, swimming, or biking. Also include strength training at least 2 times each week. Your healthcare providers can help you create a physical activity plan. Do not smoke. Nicotine and other chemicals in cigarettes and cigars can raise your cholesterol levels. Ask your healthcare provider for information if you currently smoke and need help to quit. E-cigarettes or smokeless tobacco still contain nicotine. Talk to your healthcare provider before you use these products. Limit or do not drink alcohol. Alcohol can increase your triglyceride levels. Ask your healthcare provider before you drink alcohol. Ask how much is okay for you to drink in 24 hours or 1 week. Follow up with your doctor as directed:  Write down your questions so you remember to ask them during your visits. © Copyright Elisha Goltz 2023 Information is for End User's use only and may not be sold, redistributed or otherwise used for commercial purposes. The above information is an  only. It is not intended as medical advice for individual conditions or treatments. Talk to your doctor, nurse or pharmacist before following any medical regimen to see if it is safe and effective for you.

## 2023-12-04 NOTE — ASSESSMENT & PLAN NOTE
Ent evaluation. Discussed stopping afrin- rebound congestion contributing to problem.  Continue with zyrtec, sudafed and flonase

## 2023-12-04 NOTE — PROGRESS NOTES
Assessment/Plan:      1. Hypotestosteronemia in male  Assessment & Plan:  Repeat blood work in 3 months. Evaluation with endocrine. Pt had been training for marathon. Orders:  -     Testosterone, Total, LC/MS/MS; Future; Expected date: 03/04/2024  -     Testosterone, Total, LC/MS/MS  -     Ambulatory Referral to Endocrinology; Future    2. Other hyperlipidemia  Assessment & Plan:  Pt states he was not fasting. Had eaten oatmeal with chocolate chips prior to blood work. Discussed modifying diet and pt is very active    Orders:  -     Lipid Panel with Direct LDL reflex; Future; Expected date: 03/04/2024  -     Lipid Panel with Direct LDL reflex    3. Allergy, subsequent encounter  Assessment & Plan:  Ragweed on blood testing. Will keep appointment with allergist for further testing. 4. Hair loss  Assessment & Plan:  Continue propecia      5. Chronic nasal congestion  Assessment & Plan:  Mometasone nasal rinses. Subjective:  Chief Complaint   Patient presents with    Follow-up     Review labs         Patient ID: Rosendo Knapp is a 44 y.o. male. Pt here to discuss labs. Pt has been watching his diet for the past year- eating healthier. . Pt has been running regularly, trained for marathon. Seen by ent and allergist .   States he is tired. Review of Systems   Constitutional:  Positive for fatigue. Negative for fever. HENT:  Positive for congestion. Eyes: Negative. Respiratory: Negative. Negative for cough. Cardiovascular: Negative. Gastrointestinal: Negative. Endocrine: Negative. Genitourinary: Negative. Musculoskeletal: Negative. Skin: Negative. Allergic/Immunologic: Negative. Neurological: Negative. Psychiatric/Behavioral: Negative.            The following portions of the patient's history were reviewed and updated as appropriate: allergies, current medications, past family history, past medical history, past social history, past surgical history and problem list.    Objective:  Vitals:    12/04/23 1406   BP: 102/62   Pulse: 79   Temp: 98 °F (36.7 °C)   TempSrc: Tympanic   SpO2: 98%   Weight: 106 kg (233 lb)   Height: 5' 11" (1.803 m)      Physical Exam  Vitals and nursing note reviewed. Constitutional:       Appearance: He is well-developed. HENT:      Head: Normocephalic and atraumatic. Nose: Congestion present. Cardiovascular:      Rate and Rhythm: Normal rate and regular rhythm. Heart sounds: Normal heart sounds. Pulmonary:      Effort: Pulmonary effort is normal.      Breath sounds: Normal breath sounds. Abdominal:      General: Bowel sounds are normal.      Palpations: Abdomen is soft. Skin:     General: Skin is warm and dry. Neurological:      Mental Status: He is alert and oriented to person, place, and time. Psychiatric:         Behavior: Behavior normal.         Thought Content:  Thought content normal.         Judgment: Judgment normal.

## 2023-12-05 ENCOUNTER — TELEPHONE (OUTPATIENT)
Dept: ENDOCRINOLOGY | Facility: CLINIC | Age: 43
End: 2023-12-05

## 2023-12-05 NOTE — ASSESSMENT & PLAN NOTE
Pt states he was not fasting. Had eaten oatmeal with chocolate chips prior to blood work.  Discussed modifying diet and pt is very active

## 2024-01-03 ENCOUNTER — CONSULT (OUTPATIENT)
Dept: ENDOCRINOLOGY | Facility: HOSPITAL | Age: 44
End: 2024-01-03
Payer: COMMERCIAL

## 2024-01-03 VITALS
SYSTOLIC BLOOD PRESSURE: 122 MMHG | HEIGHT: 71 IN | HEART RATE: 74 BPM | BODY MASS INDEX: 33.12 KG/M2 | DIASTOLIC BLOOD PRESSURE: 80 MMHG | WEIGHT: 236.6 LBS

## 2024-01-03 DIAGNOSIS — E29.1 HYPOTESTOSTERONEMIA IN MALE: ICD-10-CM

## 2024-01-03 DIAGNOSIS — N62 GYNECOMASTIA: Primary | ICD-10-CM

## 2024-01-03 PROCEDURE — 99204 OFFICE O/P NEW MOD 45 MIN: CPT | Performed by: INTERNAL MEDICINE

## 2024-01-03 NOTE — PROGRESS NOTES
1/3/2024    Assessment/Plan     1. Gynecomastia    2. Hypotestosteronemia in male  -     Ambulatory Referral to Endocrinology  -     CBC and differential Lab Collect  -     Comprehensive metabolic panel Lab Collect  -     Testosterone, free, total Lab Collect  -     Sex Hormone Binding Globulin- Lab Collect  -     Prolactin Lab Collect  -     Ferritin  -     Estradiol  -     hCG, quantitative- Lab Collect  -     Follicle stimulating hormone Lab Collect  -     Luteinizing hormone Lab Collect         1. Low testosterone level.  His testosterone level was low on 2 occasions but unfortunately it was performed in the afternoon and should be performed ideally between 7 AM and 9 AM with both free and total testosterone levels. TSH has already been normal, so this has ruled out hypothyroidism. I will have to repeat his blood work and to be drawn fasting between 7 AM and 9 AM with corresponding blood work that will rule out secondary causes.    2. Gynecomastia.  He does have palpable gynecomastia which does per history seem to have started in the pubertal age range. Most likely it is pubertal gynecomastia but I will evaluate for secondary causes.     To that end, blood work will be done fasting drawn between 7 AM and 9 AM consisting of CMP, CBC and ferritin level, FSH, LH, free and total testosterone level, 6 hormone binding globulin, estradiol, HCG, prolactin. TSH has already been normal so this has ruled out hypothyroidism.     Follow-up will be determined based on the blood work but he is aware he may need a second fasting blood work to confirm hypogonadism if it is low. We did briefly discuss the possibility of testosterone replacement versus Clomid depending on whether he wants to continue to preserve fertility if he is possibly having more children.    I have spent a total time of 50 minutes on 01/03/24 in caring for this patient including Diagnostic results, Prognosis, Risks and benefits of tx options, Instructions  for management, Patient and family education, Importance of tx compliance, Risk factor reductions, Impressions, Counseling / Coordination of care, Documenting in the medical record, Reviewing / ordering tests, medicine, procedures  , and Obtaining or reviewing history  .      CC:  Low testosterone consult     HPI:  Johnny Tellez is a 43-year-old male who presents into the clinic as a new patient evaluation for low testosterone.     The patient states his concern about hair-related issues and having trouble losing weight during the visit to their family care physician. He mentions that he initiated a whole foods and plant-based diet, and participated in a series of marathons but failed to lose weight. He has consistently displayed a laid-back and leisurely disposition. He experiences a slight decrease in mood during the winter months. He remarks that he desires more sleep; feels fatigued in the afternoon and consumes a considerable amount of coffee. He takes frequent naps in the winter.     His doctor suspected a testosterone issue, he underwent testing, and the results indicated low levels. He reports that he ran a marathon last 12/30/2023, and his doctor informed him that it could have affected his testosterone levels. He states that he underwent a second round of lab tests, which also returned with low levels. His doctor believes that it is not a thyroid-related issue. He experiences a low libido, difficulty building muscle, and losing weight. He does not get erections in the morning or at night. He runs 6 to 8 hours a week. He can achieve an erection and orgasms; but he experiences poor endurance. He expresses that his running pace is notably slow, and he has not experienced any improvement in speed.      He reports encountering painful joints, particularly in his right knee. He is experiencing both hair thinning and hair loss on his scalp. He has not noticed any change in his beard growth. He shaves  regularly. He does not observe excessive hair in his pubic, chest, or axilla areas. He has experienced gynecomastia issues since puberty but does not have pain and galactorrhea episodes. He denies any hot flashes, cold intolerance, heat insensitivity, chest pain, dyspnea, heart palpitations, tremors, or shaky hands. He does not have abdominal pain, diarrhea, or nausea but encounters constipation if he does not eat well or sleep properly, especially when not in his own home.      He has activity-induced asthma. He occasionally feels lightheaded or dizzy when he stands up quickly. He denies any headaches recently, but has sinus headaches at times and is seeing an EENT specialist due to the severity of his allergies and using Astelin nasal spray, which provides relief from his symptoms. He does not have any paresthesia, diplopia, or loss of vision. He sleeps well at night and goes to bed at 10:00 PM and wakes up at 7:00 AM. He has not noticed any acne, bright pink stretch marks, dry skin, nails breaking, or any food getting stuck in his throat.    He started on Propecia a month ago. He denies any lumps when he was a child.     His father, paternal uncle, and grandfather had gynecomastia issues.      Review of Systems  The pertinent positive and negative findings are as noted in the HPI.    Historical Information   Past Medical History:   Diagnosis Date    Allergic     Asthma     cold temperature and activity induced     Past Surgical History:   Procedure Laterality Date    ELBOW SURGERY Right     ulnar nerve relocated.     Social History   Social History     Substance and Sexual Activity   Alcohol Use Not Currently     Social History     Substance and Sexual Activity   Drug Use Not Currently     Social History     Tobacco Use   Smoking Status Former    Current packs/day: 0.00    Types: Cigarettes    Quit date:     Years since quittin.0   Smokeless Tobacco Never     Family History:   Family History   Problem  "Relation Age of Onset    Bipolar disorder Mother     Hyperlipidemia Father     Heart attack Father     No Known Problems Sister     No Known Problems Sister     Hyperlipidemia Maternal Aunt     No Known Problems Son     No Known Problems Daughter        Meds/Allergies   Current Outpatient Medications   Medication Sig Dispense Refill    albuterol (Proventil HFA) 90 mcg/act inhaler Inhale 2 puffs every 6 (six) hours as needed for wheezing 6.7 g 1    azelastine (ASTELIN) 0.1 % nasal spray 1-2 sprays into each nostril 2 (two) times a day as needed for rhinitis Use in each nostril as directed 30 mL 5    finasteride (PROPECIA) 1 MG tablet Take 1 tablet (1 mg total) by mouth daily 90 tablet 1    Mometasone Furoate POWD Dissolve 1 mg in [facility] Sinus rinse bottle and irrigate sinuses twice daily. 90 day supply 0.18 g 3     No current facility-administered medications for this visit.     Allergies   Allergen Reactions    Mixed Ragweed Allergic Rhinitis    Molds & Smuts Allergic Rhinitis    Seasonal Ic [Octacosanol] Sneezing       Objective   Vitals: Blood pressure 122/80, pulse 74, height 5' 11\" (1.803 m), weight 107 kg (236 lb 9.6 oz).  Invasive Devices       None                   Physical Exam    Physical exam normal with pertinent positives and negatives.  Constitutional: No moon facies.  Eyes: No lid lags, stare, proptosis, or periorbital edema.   Neck: Thyroid normal in size without palpable nodules. No buffalo hump. No supraclavicular fat pad.   Respiratory: Lungs are clear.  Cardiovascular: Heart regular without murmurs.   Genitourinary exam: Notable for a normal size penis and normal pubic hair distribution. The testicles are just slightly small, Dwight stage 4 size.   Endocrine: There is evidence of gynecomastia with about a 3 x 3 cm area of breast tissue underneath the nipple but no nipple discharge.  Integumentary: No bright pink stretch marks. No violaceous striae. Normal male pattern body hair.  Neurological: " No tremor in the outstretched hands. Patellar deep tendon reflexes normal.      Lab Results:          Lab Results   Component Value Date    CREATININE 1.01 11/20/2023    BUN 10 11/20/2023    K 4.4 11/20/2023     11/20/2023    CO2 25 11/20/2023     eGFR   Date Value Ref Range Status   11/20/2023 95 > OR = 60 mL/min/1.73m2 Final         Lab Results   Component Value Date    HDL 39 (L) 11/20/2023    TRIG 464 (H) 11/20/2023       Lab Results   Component Value Date    ALT 20 11/20/2023    AST 20 11/20/2023    ALKPHOS 68 11/20/2023       Blood work performed on 11/20/2023 at 2:45 PM showed a total testosterone of 181 but no corresponding free testosterone was tested and TSH was 2.05 which was normal.    Repeat testosterone drawn at 3:04 PM demonstrated a total testosterone of 248 but no corresponding free testosterone was performed.    Future Appointments   Date Time Provider Department Center   1/12/2024  9:30 AM Noel Bender MD [facility]  [facility]   3/21/2024  3:00 PM Romie Denise DO [facility]  [facility]       Transcribed for Gloria Gifford MD, by Maia Faria on 01/03/24 at 5:57 PM. Powered by Dragon [employer].

## 2024-01-03 NOTE — PATIENT INSTRUCTIONS
Let's get blood work done now fasting between 7-9 am.     You may need a second blood test.     Follow up to be determined.

## 2024-01-09 LAB
ALBUMIN SERPL-MCNC: 4.4 G/DL (ref 3.6–5.1)
ALBUMIN/GLOB SERPL: 1.9 (CALC) (ref 1–2.5)
ALP SERPL-CCNC: 62 U/L (ref 36–130)
ALT SERPL-CCNC: 26 U/L (ref 9–46)
AST SERPL-CCNC: 25 U/L (ref 10–40)
B-HCG SERPL-ACNC: <5 MIU/ML
BASOPHILS # BLD AUTO: 57 CELLS/UL (ref 0–200)
BASOPHILS NFR BLD AUTO: 0.7 %
BILIRUB SERPL-MCNC: 0.5 MG/DL (ref 0.2–1.2)
BUN SERPL-MCNC: 9 MG/DL (ref 7–25)
BUN/CREAT SERPL: NORMAL (CALC) (ref 6–22)
CALCIUM SERPL-MCNC: 9.2 MG/DL (ref 8.6–10.3)
CHLORIDE SERPL-SCNC: 109 MMOL/L (ref 98–110)
CO2 SERPL-SCNC: 22 MMOL/L (ref 20–32)
CREAT SERPL-MCNC: 0.93 MG/DL (ref 0.6–1.29)
EOSINOPHIL # BLD AUTO: 300 CELLS/UL (ref 15–500)
EOSINOPHIL NFR BLD AUTO: 3.7 %
ERYTHROCYTE [DISTWIDTH] IN BLOOD BY AUTOMATED COUNT: 12.9 % (ref 11–15)
ESTRADIOL SERPL-MCNC: 36 PG/ML
FERRITIN SERPL-MCNC: 51 NG/ML (ref 38–380)
FSH SERPL-ACNC: 3.7 MIU/ML (ref 1.4–12.8)
GFR/BSA.PRED SERPLBLD CYS-BASED-ARV: 105 ML/MIN/1.73M2
GLOBULIN SER CALC-MCNC: 2.3 G/DL (CALC) (ref 1.9–3.7)
GLUCOSE SERPL-MCNC: 88 MG/DL (ref 65–99)
HCT VFR BLD AUTO: 40.2 % (ref 38.5–50)
HGB BLD-MCNC: 13.8 G/DL (ref 13.2–17.1)
LH SERPL-ACNC: 2.9 MIU/ML (ref 1.5–9.3)
LYMPHOCYTES # BLD AUTO: 2697 CELLS/UL (ref 850–3900)
LYMPHOCYTES NFR BLD AUTO: 33.3 %
MCH RBC QN AUTO: 30.5 PG (ref 27–33)
MCHC RBC AUTO-ENTMCNC: 34.3 G/DL (ref 32–36)
MCV RBC AUTO: 88.7 FL (ref 80–100)
MONOCYTES # BLD AUTO: 842 CELLS/UL (ref 200–950)
MONOCYTES NFR BLD AUTO: 10.4 %
NEUTROPHILS # BLD AUTO: 4204 CELLS/UL (ref 1500–7800)
NEUTROPHILS NFR BLD AUTO: 51.9 %
PLATELET # BLD AUTO: 289 THOUSAND/UL (ref 140–400)
PMV BLD REES-ECKER: 8.8 FL (ref 7.5–12.5)
POTASSIUM SERPL-SCNC: 4.3 MMOL/L (ref 3.5–5.3)
PROLACTIN SERPL-MCNC: 7.4 NG/ML (ref 2–18)
PROT SERPL-MCNC: 6.7 G/DL (ref 6.1–8.1)
RBC # BLD AUTO: 4.53 MILLION/UL (ref 4.2–5.8)
SHBG SERPL-SCNC: 15 NMOL/L (ref 10–50)
SODIUM SERPL-SCNC: 140 MMOL/L (ref 135–146)
TESTOST FREE SERPL-MCNC: 70.6 PG/ML (ref 35–155)
TESTOST SERPL-MCNC: 311 NG/DL (ref 250–1100)
WBC # BLD AUTO: 8.1 THOUSAND/UL (ref 3.8–10.8)

## 2024-02-01 PROBLEM — Z00.00 WELL ADULT EXAM: Status: RESOLVED | Noted: 2023-12-03 | Resolved: 2024-02-01

## 2024-02-09 DIAGNOSIS — L65.9 HAIR LOSS: ICD-10-CM

## 2024-02-09 RX ORDER — FINASTERIDE 1 MG/1
1 TABLET, FILM COATED ORAL DAILY
Qty: 90 TABLET | Refills: 0 | Status: SHIPPED | OUTPATIENT
Start: 2024-02-09

## 2024-03-05 DIAGNOSIS — L65.9 HAIR LOSS: ICD-10-CM

## 2024-03-05 RX ORDER — FINASTERIDE 1 MG/1
1 TABLET, FILM COATED ORAL DAILY
Qty: 90 TABLET | Refills: 1 | Status: SHIPPED | OUTPATIENT
Start: 2024-03-05

## 2024-03-11 ENCOUNTER — OFFICE VISIT (OUTPATIENT)
Dept: OBGYN CLINIC | Facility: CLINIC | Age: 44
End: 2024-03-11
Payer: COMMERCIAL

## 2024-03-11 VITALS
SYSTOLIC BLOOD PRESSURE: 118 MMHG | BODY MASS INDEX: 33.04 KG/M2 | HEIGHT: 71 IN | DIASTOLIC BLOOD PRESSURE: 70 MMHG | WEIGHT: 236 LBS

## 2024-03-11 DIAGNOSIS — M25.561 CHRONIC PAIN OF RIGHT KNEE: Primary | ICD-10-CM

## 2024-03-11 DIAGNOSIS — G89.29 CHRONIC PAIN OF RIGHT KNEE: Primary | ICD-10-CM

## 2024-03-11 DIAGNOSIS — M76.62 ACHILLES TENDINITIS OF BOTH LOWER EXTREMITIES: ICD-10-CM

## 2024-03-11 DIAGNOSIS — M76.61 ACHILLES TENDINITIS OF BOTH LOWER EXTREMITIES: ICD-10-CM

## 2024-03-11 PROCEDURE — 99203 OFFICE O/P NEW LOW 30 MIN: CPT | Performed by: FAMILY MEDICINE

## 2024-03-11 NOTE — PROGRESS NOTES
1. Chronic pain of right knee  MRI knee right  wo contrast      2. Achilles tendinitis of both lower extremities          Orders Placed This Encounter   Procedures    MRI knee right  wo contrast        IMAGING STUDIES: (I personally reviewed images in PACS and report):  X-ray [place] right knee within the last 2 years: No acute osseous normality per patient.  Record unavailable for review      PAST REPORTS:        ASSESSMENT/PLAN:  Problem #1  Chronic Right anterior knee pain since 2022  Avid cyclist x 15 years but unable to return to same volume  Recent transition to marathon running 2023  Overuse syndrome    Differential diagnosis:  Patellofemoral OA, chondral fissure/thinning    PROM #2  Bilateral Achilles tendinosis  Pes planus    Repeat X-ray next visit: None    Return for Follow-up after MRI is completed for review.    Patient instructions below verbally summarized in person during encounter:  Patient Instructions   MRI right knee evaluate for possible chondral defect versus patellofemoral OA  Will consider corticosteroid injection right knee    Continue home exercise program for Achilles tendinitis bilateral.  Handout given.  Patient's sister is also physical therapist and recommended follow-up with her for detailed exercises.    I explained the patient that his injuries are due to overuse syndrome.  I recommended spacing out his workouts and exercise with breaks in between to allow for healing.          __________________________________________________________________________    HISTORY OF PRESENT ILLNESS:    Knee pain chronic since November 2022. Anterior pain since riding bike. Cycling 2000 miles in 2022. Hour into ride continued to have pain. Has seen bike fitter eval with new bike. Has seen PT. Has been riding for 15 years. Had xrays done with doctor in [place] which was normal. Has been unable to return to riding since 2022.     Bilateral achilles pain posterior pain after performing burpee 6  "weeks ago left and then developed soon thereafter in the right achilles. Ran marathons last year.  had achilles problem playing baseball with intermittent pain since. Has treated with home exercise program, heel lifts in past. No physical therapy formally yet.  Currently left ankle is improving.      Review of Systems      Following history reviewed and update:    Past Medical History:   Diagnosis Date    Allergic     Asthma     cold temperature and activity induced     Past Surgical History:   Procedure Laterality Date    ELBOW SURGERY Right     ulnar nerve relocated.     Social History   Social History     Substance and Sexual Activity   Alcohol Use Not Currently     Social History     Substance and Sexual Activity   Drug Use Not Currently     Social History     Tobacco Use   Smoking Status Former    Current packs/day: 0.00    Types: Cigarettes    Quit date:     Years since quittin.2   Smokeless Tobacco Never     Family History   Problem Relation Age of Onset    Bipolar disorder Mother     Hyperlipidemia Father     Heart attack Father     No Known Problems Sister     No Known Problems Sister     Hyperlipidemia Maternal Aunt     No Known Problems Son     No Known Problems Daughter      Allergies   Allergen Reactions    Mixed Ragweed Allergic Rhinitis    Molds & Smuts Allergic Rhinitis    Seasonal Ic [Octacosanol] Sneezing          Physical Exam  /70 (BP Location: Right arm, Patient Position: Sitting, Cuff Size: Standard)   Ht 5' 11\" (1.803 m)   Wt 107 kg (236 lb)   BMI 32.92 kg/m²         Ortho Exam  RIGHT KNEE:  Erythema: no  Swelling: no  Increased Warmth: no  Tenderness: none  Flexion: intact  Extension: intact  Pain reproduced anterior knee with slight flexion and resisted isometric extension of the knee  Patellar Displacement:  Patellar Tilt:  Patellar Apprehension: negative  Patellar Grind Isbell's: negative  Lachman's: negative  Varus laxity: negative  Valgus laxity: negative  [facility]: " negative     RIGHT ACHILLES EXAMINATION:  + Tenderness myotendinous junction  Simmonds’ Triad:  Palpable Gap or Defect of Achilles: none  Angle of Declination: angle of baseline plantarflexion symmetric to contralateral side  Matles Test (patient prone, intact and symmetric plantarflexion of ankle when flexing knee): intact  Thomas's Calf Squeeze Test: intact obligatory plantarflexion      LEFT ACHILLES EXAMINATION:  + Tenderness mid substance  Simmonds’ Triad:  Palpable Gap or Defect of Achilles: none  Angle of Declination: angle of baseline plantarflexion symmetric to contralateral side  Matles Test (patient prone, intact and symmetric plantarflexion of ankle when flexing knee): intact  Thomas's Calf Squeeze Test: intact obligatory plantarflexion      __________________________________________________________________________  Procedures

## 2024-03-11 NOTE — PATIENT INSTRUCTIONS
MRI right knee evaluate for possible chondral defect versus patellofemoral OA  Will consider corticosteroid injection right knee    Continue home exercise program for Achilles tendinitis bilateral.  Handout given.  Patient's sister is also physical therapist and recommended follow-up with her for detailed exercises.    I explained the patient that his injuries are due to overuse syndrome.  I recommended spacing out his workouts and exercise with breaks in between to allow for healing.

## 2024-03-15 ENCOUNTER — HOSPITAL ENCOUNTER (OUTPATIENT)
Dept: MRI IMAGING | Facility: HOSPITAL | Age: 44
End: 2024-03-15
Payer: COMMERCIAL

## 2024-03-15 DIAGNOSIS — G89.29 CHRONIC PAIN OF RIGHT KNEE: ICD-10-CM

## 2024-03-15 DIAGNOSIS — M25.561 CHRONIC PAIN OF RIGHT KNEE: ICD-10-CM

## 2024-03-15 PROCEDURE — 73721 MRI JNT OF LWR EXTRE W/O DYE: CPT

## 2024-03-19 ENCOUNTER — OFFICE VISIT (OUTPATIENT)
Dept: OBGYN CLINIC | Facility: OTHER | Age: 44
End: 2024-03-19
Payer: COMMERCIAL

## 2024-03-19 VITALS
WEIGHT: 233 LBS | BODY MASS INDEX: 32.62 KG/M2 | HEIGHT: 71 IN | DIASTOLIC BLOOD PRESSURE: 88 MMHG | SYSTOLIC BLOOD PRESSURE: 128 MMHG

## 2024-03-19 DIAGNOSIS — M17.11 PATELLOFEMORAL ARTHRITIS OF RIGHT KNEE: ICD-10-CM

## 2024-03-19 DIAGNOSIS — M85.60 SUBCHONDRAL BONE CYST: ICD-10-CM

## 2024-03-19 DIAGNOSIS — M25.561 CHRONIC PAIN OF RIGHT KNEE: Primary | ICD-10-CM

## 2024-03-19 DIAGNOSIS — M76.61 ACHILLES TENDINITIS OF BOTH LOWER EXTREMITIES: ICD-10-CM

## 2024-03-19 DIAGNOSIS — M76.62 ACHILLES TENDINITIS OF BOTH LOWER EXTREMITIES: ICD-10-CM

## 2024-03-19 DIAGNOSIS — G89.29 CHRONIC PAIN OF RIGHT KNEE: Primary | ICD-10-CM

## 2024-03-19 PROCEDURE — 99214 OFFICE O/P EST MOD 30 MIN: CPT | Performed by: FAMILY MEDICINE

## 2024-03-19 NOTE — PROGRESS NOTES
1. Chronic pain of right knee        2. Achilles tendinitis of both lower extremities        3. Subchondral bone cyst        4. Patellofemoral arthritis of right knee          No orders of the defined types were placed in this encounter.       IMAGING STUDIES: (I personally reviewed images in PACS and report):     3/15/2024 MRI of the right knee  Tricompartment chondrosis, worst in the patellofemoral compartment.     Intact menisci. Intact cruciate and collateral ligaments.    PAST REPORTS:    X-ray [place] right knee within the last 2 years: No acute osseous normality per patient. Record unavailable for review     Past procedures:       ASSESSMENT/PLAN:  Problem #1  Chronic Right anterior knee pain since 2022  Avid cyclist x 15 years but unable to return to same volume  Recent transition to marathon running 2023  Overuse syndrome     Differential diagnosis:  Patellofemoral OA, Subchondral cyst vs OCD  Plan:  Will start T-rom brace today to reduce hyperextension of the right knee.   Refer to ortho surgery to discuss surgical option.   Follow up with specialist  Would consider CSI if ok with orthopedics surgeon and no further surgical intervention.      PROB #2  Bilateral Achilles tendinosis-improving with PT  Pes planus  Continue PT  Repeat X-ray next visit: None    No follow-ups on file.    There are no Patient Instructions on file for this visit.      __________________________________________________________________________    HISTORY OF PRESENT ILLNESS:      He is here for a follow-up of the right knee pain likely in the setting of patellofemoral osteoarthritis, and chondral fissure/thinning.  Recommended MRI. CSI if patellofemoral OA.   He stated the knee pain is about the same. The pain started in 11/2022. Pain is located in the ant knee. He did went to the bike fitter and have it adjusted but the pain persists. He also did PT for a few months and it did help. The pain continue to locate at the quad tendon  "insertion site. Pain is 3/10.     Is also here for bilateral Achilles tendinosis, recommended to start physical therapy. He did report improvement of his heel pain. Denied pain but does feel achy.       Review of Systems  Review of Systems     Following history reviewed and update:    Past Medical History:   Diagnosis Date    Allergic     Asthma     cold temperature and activity induced     Past Surgical History:   Procedure Laterality Date    ELBOW SURGERY Right     ulnar nerve relocated.     Social History   Social History     Substance and Sexual Activity   Alcohol Use Not Currently     Social History     Substance and Sexual Activity   Drug Use Not Currently     Social History     Tobacco Use   Smoking Status Former    Current packs/day: 0.00    Types: Cigarettes    Quit date:     Years since quittin.2   Smokeless Tobacco Never     Family History   Problem Relation Age of Onset    Bipolar disorder Mother     Hyperlipidemia Father     Heart attack Father     No Known Problems Sister     No Known Problems Sister     Hyperlipidemia Maternal Aunt     No Known Problems Son     No Known Problems Daughter      Allergies   Allergen Reactions    Mixed Ragweed Allergic Rhinitis    Molds & Smuts Allergic Rhinitis    Seasonal Ic [Octacosanol] Sneezing          Physical Exam  /88 (BP Location: Left arm, Patient Position: Sitting, Cuff Size: Standard)   Ht 5' 11\" (1.803 m)   Wt 106 kg (233 lb)   BMI 32.50 kg/m²       Ortho Exam  RIGHT KNEE:  Erythema: no  Swelling: no  Increased Warmth: no  Tenderness: quad tendon  Flexion: seated at 90  Extension: intact  Drawer: negative  Varus laxity: negative  Valgus laxity: negative        __________________________________________________________________________  Procedures                  "

## 2024-04-11 ENCOUNTER — OFFICE VISIT (OUTPATIENT)
Dept: OBGYN CLINIC | Facility: CLINIC | Age: 44
End: 2024-04-11
Payer: COMMERCIAL

## 2024-04-11 VITALS
HEART RATE: 90 BPM | SYSTOLIC BLOOD PRESSURE: 126 MMHG | WEIGHT: 233 LBS | RESPIRATION RATE: 17 BRPM | DIASTOLIC BLOOD PRESSURE: 84 MMHG | BODY MASS INDEX: 32.62 KG/M2 | HEIGHT: 71 IN

## 2024-04-11 DIAGNOSIS — M85.60 SUBCHONDRAL BONE CYST: ICD-10-CM

## 2024-04-11 DIAGNOSIS — M17.11 OSTEOARTHRITIS OF RIGHT PATELLOFEMORAL JOINT: Primary | ICD-10-CM

## 2024-04-11 PROCEDURE — 99204 OFFICE O/P NEW MOD 45 MIN: CPT | Performed by: STUDENT IN AN ORGANIZED HEALTH CARE EDUCATION/TRAINING PROGRAM

## 2024-04-11 PROCEDURE — 20610 DRAIN/INJ JOINT/BURSA W/O US: CPT | Performed by: STUDENT IN AN ORGANIZED HEALTH CARE EDUCATION/TRAINING PROGRAM

## 2024-04-11 RX ORDER — BUPIVACAINE HYDROCHLORIDE 2.5 MG/ML
4 INJECTION, SOLUTION INFILTRATION; PERINEURAL
Status: COMPLETED | OUTPATIENT
Start: 2024-04-11 | End: 2024-04-11

## 2024-04-11 RX ORDER — TRIAMCINOLONE ACETONIDE 40 MG/ML
40 INJECTION, SUSPENSION INTRA-ARTICULAR; INTRAMUSCULAR
Status: COMPLETED | OUTPATIENT
Start: 2024-04-11 | End: 2024-04-11

## 2024-04-11 RX ADMIN — TRIAMCINOLONE ACETONIDE 40 MG: 40 INJECTION, SUSPENSION INTRA-ARTICULAR; INTRAMUSCULAR at 08:30

## 2024-04-11 RX ADMIN — BUPIVACAINE HYDROCHLORIDE 4 ML: 2.5 INJECTION, SOLUTION INFILTRATION; PERINEURAL at 08:30

## 2024-04-11 NOTE — LETTER
April 11, 2024     Conrad Norton MD  [address] [place] [id number]    Patient: Johnny Tellez   YOB: 1980   Date of Visit: 4/11/2024       Dear Dr. Norton:    Thank you for referring Johnny Tellez to me for evaluation. Below are my notes for this consultation.    If you have questions, please do not hesitate to call me. I look forward to following your patient along with you.         Sincerely,        Yao Pineda, DO        CC: Smith Wylie III, DO    Yao Pineda DO  4/11/2024 10:24 AM  Incomplete  Ortho Sports Medicine Knee New Patient Visit     Assesment:   43 y.o. male right knee patellofemoral osteoarthritis.    Plan:  The patient's diagnosis and treatment were discussed at length today. We discussed no treatment, non-operative treatment, and operative treatment.      Johnny presents today with right knee chondromalacia.  Pathophysiology was discussed at length.  Corticosteroid injection provided and emphasis on continued posterior chain strengthening to avoid quad/patellofemoral overload.  Patient can follow-up as needed.  Low concern regarding cystic changes in the ACL insertion of the tibia as his ACL is intact/solid today and he has no complaints of knee instability.        Conservative treatment:    Ice to knee for 20 minutes at least 1-2 times daily.  OTC NSAIDS prn for pain.  Tylenol for pain.  Recommended patient avoid jumping activities that may make his pain worse.  Recommended patient gradually increase activity as tolerated.    Imaging:    All imaging from today was reviewed by myself and explained to the patient.       Injection:    The risks and benefits of the injection (which include but are not limited to: infection, bleeding,damage to nerve/artery, need for further intervention), as well as the risks and benefits of all alternative treatments were explained and understood.  The patient elected to proceed with injection.  The procedure  was done with aseptic technique, and the patient tolerated the procedure well with no complications.  A corticosteroid injection was performed.  The patient should take 1-2 days off of activity, with gradual return to activity as tolerated.  Ice to the knee 1-2 times daily for 20 minutes, for next 24-48 hrs.    Large joint arthrocentesis: R knee  Universal Protocol:  Consent: Verbal consent obtained.  Consent given by: patient  Timeout called at: 4/11/2024 8:51 AM.  Patient understanding: patient states understanding of the procedure being performed  Patient identity confirmed: verbally with patient  Supporting Documentation  Indications: pain   Procedure Details  Location: knee - R knee  Needle size: 22 G  Ultrasound guidance: no  Approach: anterolateral  Medications administered: 4 mL bupivacaine 0.25 %; 40 mg triamcinolone acetonide 40 mg/mL    Patient tolerance: patient tolerated the procedure well with no immediate complications          Surgery:     No surgery is recommended at this point, continue with conservative treatment plan as noted.      Follow up:    No follow-ups on file.        Chief Complaint   Patient presents with   • Right Knee - Pain       History of Present Illness:    The patient is a 43 y.o. male whose occupation is , referred to me by Dr. Wylie, seen in clinic for consultation of right knee pain.  The patient is very active mountain biking, running marathons, and doing crossfit.    Pain is located anterior.  The patient rates the pain as a 0/10. The pain is characterized as dull and achy. The pain has been present since November 2022. The patient reports a couple injuries to his knee including hyperextension when suspension bottomed out and hitting his knee. Pain is present with knee extension such as leg press with weight, standing on his bike with cycling. Pain is worth with cycling than running. Patient reports he treated his knee as patellofemoral syndrome with 6 months of PT  Change in mental status and worked to adjust his cycling form. Patient modifies crossfit and lifts more upper body than lower body.     Pain is improved by rest, physical therapy, and ibuprofen.  Pain is aggravated by cycling and leg press or knee extensions with weight.  Patient denies any catching, clicking or locking in the knee.     The patient has tried rest, physical therapy, ibuprofen, and TROM.      Knee Surgical History:  None    Past Medical, Social and Family History:  Past Medical History:   Diagnosis Date   • Allergic    • Asthma     cold temperature and activity induced     Past Surgical History:   Procedure Laterality Date   • ELBOW SURGERY Right     ulnar nerve relocated.     Allergies   Allergen Reactions   • Mixed Ragweed Allergic Rhinitis   • Molds & Smuts Allergic Rhinitis   • Seasonal Ic [Octacosanol] Sneezing     Current Outpatient Medications on File Prior to Visit   Medication Sig Dispense Refill   • albuterol (Proventil HFA) 90 mcg/act inhaler Inhale 2 puffs every 6 (six) hours as needed for wheezing 6.7 g 1   • Azelastine HCl 137 MCG/SPRAY SOLN 1-2 SPRAYS INTO EACH NOSTRIL 2 TIMES A DAY AS NEEDED FOR RHINITIS USE IN EACH NOSTRIL AS DIRECTED 90 mL 3   • finasteride (PROPECIA) 1 MG tablet TAKE 1 TABLET BY MOUTH EVERY DAY 90 tablet 1   • Mometasone Furoate POWD Dissolve 1 mg in Neilmed Sinus rinse bottle and irrigate sinuses twice daily. 90 day supply 0.18 g 3     No current facility-administered medications on file prior to visit.     Social History     Socioeconomic History   • Marital status: /Civil Union     Spouse name: Not on file   • Number of children: Not on file   • Years of education: Not on file   • Highest education level: Not on file   Occupational History   • Not on file   Tobacco Use   • Smoking status: Former     Current packs/day: 0.00     Types: Cigarettes     Quit date:      Years since quittin.2   • Smokeless tobacco: Never   Vaping Use   • Vaping status: Never Used   Substance  "and Sexual Activity   • Alcohol use: Not Currently   • Drug use: Not Currently   • Sexual activity: Not on file   Other Topics Concern   • Not on file   Social History Narrative   • Not on file     Social Determinants of Health     Financial Resource Strain: Not on file   Food Insecurity: Not on file   Transportation Needs: Not on file   Physical Activity: Not on file   Stress: Not on file   Social Connections: Not on file   Intimate Partner Violence: Not on file   Housing Stability: Not on file         I have reviewed the past medical, surgical, social and family history, medications and allergies as documented in the EMR.    Review of systems: ROS is negative other than that noted in the HPI.  Constitutional: Negative for fatigue and fever.   HENT: Negative for sore throat.    Respiratory: Negative for shortness of breath.    Cardiovascular: Negative for chest pain.   Gastrointestinal: Negative for abdominal pain.   Endocrine: Negative for cold intolerance and heat intolerance.   Genitourinary: Negative for flank pain.   Musculoskeletal: Negative for back pain.   Skin: Negative for rash.   Allergic/Immunologic: Negative for immunocompromised state.   Neurological: Negative for dizziness.   Psychiatric/Behavioral: Negative for agitation.      Physical Exam:    Blood pressure 126/84, pulse 90, resp. rate 17, height 5' 11\" (1.803 m), weight 106 kg (233 lb).    General/Constitutional: NAD, well developed, well nourished  HENT: Normocephalic, atraumatic  CV: Intact distal pulses, regular rate  Resp: No respiratory distress or labored breathing  Lymphatic: No lymphadenopathy palpated  Neuro: Alert and Oriented x 3, no focal deficits  Psych: Normal mood, normal affect, normal judgement, normal behavior  Skin: Warm, dry, no rashes, no erythema      Knee Exam (focused):  Visual inspection of the Right knee demonstrates normal contour without atrophy.   No previous incisions   There is no significant erythema or edema.  "   No significant joint effusion   Range of motion is full from 0-130 degrees of flexion   Able to straight leg raise   Not tender to palpation  Negative medial joint line tenderness, Negative  lateral joint line tenderness  Negative  medial Rose's, Negative  lateral Rose's  Stable  (1A) Lachman exam, Stable posterior drawer  Stable to varus and valgus stress at both 0 and 30°  Patella tracks normally.  No J sign.  No apprehension.  Translation is approximately 2 quadrants and is equal to the contralateral side  Patellar eversion is similar to the contralateral side    Examination of the patient's ipsilateral hip demonstrates full painless range of motion.  No crepitus.      LE NV Exam: +2 DP/PT pulses bilaterally  Sensation intact to light touch L2-S1 bilaterally     Bilateral hip ROM demonstrates no pain actively or passively    No calf tenderness to palpation bilaterally    Knee Imaging    MRI of the right knee were reviewed, which demonstrate tricompartmental chondromalacia greatest in the patellofemoral compartment.  There is cystic change at the tibial insertion of the ACL however the ACL is intact.  No disruption of the tibial sided fibers.  Mid substance and femoral sided fibers are intact.  No evidence of ACL bone bruise pattern.  I agree with radiologist interpretation.      Scribe Attestation      I,:   am acting as a scribe while in the presence of the attending physician.:       I,:   personally performed the services described in this documentation    as scribed in my presence.:               Suzie Stroud  4/11/2024  8:58 AM  Sign when Signing Visit  Ortho Sports Medicine Knee New Patient Visit     Assesment:   43 y.o. male right knee patellofemoral osteoarthritis.    Plan:  The patient's diagnosis and treatment were discussed at length today. We discussed no treatment, non-operative treatment, and operative treatment.    ***.    Conservative treatment:    Ice to knee for 20 minutes at least  1-2 times daily.  OTC NSAIDS prn for pain.  Tylenol for pain.  Recommended patient avoid jumping activities that may make his pain worse.  Recommended patient gradually increase activity as tolerated.    Imaging:    All imaging from today was reviewed by myself and explained to the patient.       Injection:    The risks and benefits of the injection (which include but are not limited to: infection, bleeding,damage to nerve/artery, need for further intervention), as well as the risks and benefits of all alternative treatments were explained and understood.  The patient elected to proceed with injection.  The procedure was done with aseptic technique, and the patient tolerated the procedure well with no complications.  A corticosteroid injection was performed.  The patient should take 1-2 days off of activity, with gradual return to activity as tolerated.  Ice to the knee 1-2 times daily for 20 minutes, for next 24-48 hrs.    Large joint arthrocentesis: R knee  Universal Protocol:  Consent: Verbal consent obtained.  Consent given by: patient  Timeout called at: 4/11/2024 8:51 AM.  Patient understanding: patient states understanding of the procedure being performed  Patient identity confirmed: verbally with patient  Supporting Documentation  Indications: pain   Procedure Details  Location: knee - R knee  Needle size: 22 G  Ultrasound guidance: no  Approach: anterolateral  Medications administered: 4 mL bupivacaine 0.25 %; 40 mg triamcinolone acetonide 40 mg/mL    Patient tolerance: patient tolerated the procedure well with no immediate complications          Surgery:     No surgery is recommended at this point, continue with conservative treatment plan as noted.      Follow up:    No follow-ups on file.        Chief Complaint   Patient presents with   • Right Knee - Pain       History of Present Illness:    The patient is a 43 y.o. male whose occupation is , referred to me by Dr. Wylie, seen in clinic for  consultation of right knee pain.  The patient is very active mountain biking, running marathons, and doing crossfit.    Pain is located anterior.  The patient rates the pain as a 0/10. The pain is characterized as dull and achy. The pain has been present since November 2022. The patient reports a couple injuries to his knee including hyperextension when suspension bottomed out and hitting his knee. Pain is present with knee extension such as leg press with weight, standing on his bike with cycling. Pain is worth with cycling than running. Patient reports he treated his knee as patellofemoral syndrome with 6 months of PT and worked to adjust his cycling form. Patient modifies crossfit and lifts more upper body than lower body.     Pain is improved by rest, physical therapy, and ibuprofen.  Pain is aggravated by cycling and leg press or knee extensions with weight.  Patient denies any catching, clicking or locking in the knee.     The patient has tried rest, physical therapy, ibuprofen, and TROM.      Knee Surgical History:  None    Past Medical, Social and Family History:  Past Medical History:   Diagnosis Date   • Allergic    • Asthma     cold temperature and activity induced     Past Surgical History:   Procedure Laterality Date   • ELBOW SURGERY Right     ulnar nerve relocated.     Allergies   Allergen Reactions   • Mixed Ragweed Allergic Rhinitis   • Molds & Smuts Allergic Rhinitis   • Seasonal Ic [Octacosanol] Sneezing     Current Outpatient Medications on File Prior to Visit   Medication Sig Dispense Refill   • albuterol (Proventil HFA) 90 mcg/act inhaler Inhale 2 puffs every 6 (six) hours as needed for wheezing 6.7 g 1   • Azelastine HCl 137 MCG/SPRAY SOLN 1-2 SPRAYS INTO EACH NOSTRIL 2 TIMES A DAY AS NEEDED FOR RHINITIS USE IN EACH NOSTRIL AS DIRECTED 90 mL 3   • finasteride (PROPECIA) 1 MG tablet TAKE 1 TABLET BY MOUTH EVERY DAY 90 tablet 1   • Mometasone Furoate POWD Dissolve 1 mg in Neilmed Sinus rinse  bottle and irrigate sinuses twice daily. 90 day supply 0.18 g 3     No current facility-administered medications on file prior to visit.     Social History     Socioeconomic History   • Marital status: /Civil Union     Spouse name: Not on file   • Number of children: Not on file   • Years of education: Not on file   • Highest education level: Not on file   Occupational History   • Not on file   Tobacco Use   • Smoking status: Former     Current packs/day: 0.00     Types: Cigarettes     Quit date:      Years since quittin.2   • Smokeless tobacco: Never   Vaping Use   • Vaping status: Never Used   Substance and Sexual Activity   • Alcohol use: Not Currently   • Drug use: Not Currently   • Sexual activity: Not on file   Other Topics Concern   • Not on file   Social History Narrative   • Not on file     Social Determinants of Health     Financial Resource Strain: Not on file   Food Insecurity: Not on file   Transportation Needs: Not on file   Physical Activity: Not on file   Stress: Not on file   Social Connections: Not on file   Intimate Partner Violence: Not on file   Housing Stability: Not on file         I have reviewed the past medical, surgical, social and family history, medications and allergies as documented in the EMR.    Review of systems: ROS is negative other than that noted in the HPI.  Constitutional: Negative for fatigue and fever.   HENT: Negative for sore throat.    Respiratory: Negative for shortness of breath.    Cardiovascular: Negative for chest pain.   Gastrointestinal: Negative for abdominal pain.   Endocrine: Negative for cold intolerance and heat intolerance.   Genitourinary: Negative for flank pain.   Musculoskeletal: Negative for back pain.   Skin: Negative for rash.   Allergic/Immunologic: Negative for immunocompromised state.   Neurological: Negative for dizziness.   Psychiatric/Behavioral: Negative for agitation.      Physical Exam:    Blood pressure 126/84, pulse 90, resp.  "rate 17, height 5' 11\" (1.803 m), weight 106 kg (233 lb).    General/Constitutional: NAD, well developed, well nourished  HENT: Normocephalic, atraumatic  CV: Intact distal pulses, regular rate  Resp: No respiratory distress or labored breathing  Lymphatic: No lymphadenopathy palpated  Neuro: Alert and Oriented x 3, no focal deficits  Psych: Normal mood, normal affect, normal judgement, normal behavior  Skin: Warm, dry, no rashes, no erythema      Knee Exam (focused):  Visual inspection of the Right knee demonstrates normal contour without atrophy.   No previous incisions   There is no significant erythema or edema.    No significant joint effusion   Range of motion is full from 0-130 degrees of flexion   Able to straight leg raise   Not tender to palpation  Negative medial joint line tenderness, Negative  lateral joint line tenderness  Negative  medial Rose's, Negative  lateral Rose's  Stable  (1A) Lachman exam, Stable posterior drawer  Stable to varus and valgus stress at both 0 and 30°  Patella tracks normally.  No J sign.  No apprehension.  Translation is approximately 2 quadrants and is equal to the contralateral side  Patellar eversion is similar to the contralateral side    Examination of the patient's ipsilateral hip demonstrates full painless range of motion.  No crepitus.      LE NV Exam: +2 DP/PT pulses bilaterally  Sensation intact to light touch L2-S1 bilaterally     Bilateral hip ROM demonstrates no pain actively or passively    No calf tenderness to palpation bilaterally    Knee Imaging    MRI of the right knee were reviewed, which demonstrate ***.  I have reviewed the radiology report and {radiology report:[id number]}.      Scribe Attestation      I,:   am acting as a scribe while in the presence of the attending physician.:       I,:   personally performed the services described in this documentation    as scribed in my presence.:           "

## 2024-04-11 NOTE — PROGRESS NOTES
Ortho Sports Medicine Knee New Patient Visit     Assesment:   43 y.o. male right knee patellofemoral osteoarthritis.    Plan:  The patient's diagnosis and treatment were discussed at length today. We discussed no treatment, non-operative treatment, and operative treatment.      Johnny presents today with right knee chondromalacia.  Pathophysiology was discussed at length.  Corticosteroid injection provided and emphasis on continued posterior chain strengthening to avoid quad/patellofemoral overload.  Patient can follow-up as needed.  Low concern regarding cystic changes in the ACL insertion of the tibia as his ACL is intact/solid today and he has no complaints of knee instability.        Conservative treatment:    Ice to knee for 20 minutes at least 1-2 times daily.  OTC NSAIDS prn for pain.  Tylenol for pain.  Recommended patient avoid jumping activities that may make his pain worse.  Recommended patient gradually increase activity as tolerated.    Imaging:    All imaging from today was reviewed by myself and explained to the patient.       Injection:    The risks and benefits of the injection (which include but are not limited to: infection, bleeding,damage to nerve/artery, need for further intervention), as well as the risks and benefits of all alternative treatments were explained and understood.  The patient elected to proceed with injection.  The procedure was done with aseptic technique, and the patient tolerated the procedure well with no complications.  A corticosteroid injection was performed.  The patient should take 1-2 days off of activity, with gradual return to activity as tolerated.  Ice to the knee 1-2 times daily for 20 minutes, for next 24-48 hrs.    Large joint arthrocentesis: R knee  Universal Protocol:  Consent: Verbal consent obtained.  Consent given by: patient  Timeout called at: 4/11/2024 8:51 AM.  Patient understanding: patient states understanding of the procedure being performed  Patient  identity confirmed: verbally with patient  Supporting Documentation  Indications: pain   Procedure Details  Location: knee - R knee  Needle size: 22 G  Ultrasound guidance: no  Approach: anterolateral  Medications administered: 4 mL bupivacaine 0.25 %; 40 mg triamcinolone acetonide 40 mg/mL    Patient tolerance: patient tolerated the procedure well with no immediate complications          Surgery:     No surgery is recommended at this point, continue with conservative treatment plan as noted.      Follow up:    No follow-ups on file.        Chief Complaint   Patient presents with    Right Knee - Pain       History of Present Illness:    The patient is a 43 y.o. male whose occupation is , referred to me by Dr. Wylie, seen in clinic for consultation of right knee pain.  The patient is very active mountain biking, running marathons, and doing crossfit.    Pain is located anterior.  The patient rates the pain as a 0/10. The pain is characterized as dull and achy. The pain has been present since November 2022. The patient reports a couple injuries to his knee including hyperextension when suspension bottomed out and hitting his knee. Pain is present with knee extension such as leg press with weight, standing on his bike with cycling. Pain is worth with cycling than running. Patient reports he treated his knee as patellofemoral syndrome with 6 months of PT and worked to adjust his cycling form. Patient modifies crossfit and lifts more upper body than lower body.     Pain is improved by rest, physical therapy, and ibuprofen.  Pain is aggravated by cycling and leg press or knee extensions with weight.  Patient denies any catching, clicking or locking in the knee.     The patient has tried rest, physical therapy, ibuprofen, and TROM.      Knee Surgical History:  None    Past Medical, Social and Family History:  Past Medical History:   Diagnosis Date    Allergic     Asthma     cold temperature and activity induced      Past Surgical History:   Procedure Laterality Date    ELBOW SURGERY Right     ulnar nerve relocated.     Allergies   Allergen Reactions    Mixed Ragweed Allergic Rhinitis    Molds & Smuts Allergic Rhinitis    Seasonal Ic [Octacosanol] Sneezing     Current Outpatient Medications on File Prior to Visit   Medication Sig Dispense Refill    albuterol (Proventil HFA) 90 mcg/act inhaler Inhale 2 puffs every 6 (six) hours as needed for wheezing 6.7 g 1    Azelastine HCl 137 MCG/SPRAY SOLN 1-2 SPRAYS INTO EACH NOSTRIL 2 TIMES A DAY AS NEEDED FOR RHINITIS USE IN EACH NOSTRIL AS DIRECTED 90 mL 3    finasteride (PROPECIA) 1 MG tablet TAKE 1 TABLET BY MOUTH EVERY DAY 90 tablet 1    Mometasone Furoate POWD Dissolve 1 mg in Neilmed Sinus rinse bottle and irrigate sinuses twice daily. 90 day supply 0.18 g 3     No current facility-administered medications on file prior to visit.     Social History     Socioeconomic History    Marital status: /Civil Union     Spouse name: Not on file    Number of children: Not on file    Years of education: Not on file    Highest education level: Not on file   Occupational History    Not on file   Tobacco Use    Smoking status: Former     Current packs/day: 0.00     Types: Cigarettes     Quit date:      Years since quittin.2    Smokeless tobacco: Never   Vaping Use    Vaping status: Never Used   Substance and Sexual Activity    Alcohol use: Not Currently    Drug use: Not Currently    Sexual activity: Not on file   Other Topics Concern    Not on file   Social History Narrative    Not on file     Social Determinants of Health     Financial Resource Strain: Not on file   Food Insecurity: Not on file   Transportation Needs: Not on file   Physical Activity: Not on file   Stress: Not on file   Social Connections: Not on file   Intimate Partner Violence: Not on file   Housing Stability: Not on file         I have reviewed the past medical, surgical, social and family history,  "medications and allergies as documented in the EMR.    Review of systems: ROS is negative other than that noted in the HPI.  Constitutional: Negative for fatigue and fever.   HENT: Negative for sore throat.    Respiratory: Negative for shortness of breath.    Cardiovascular: Negative for chest pain.   Gastrointestinal: Negative for abdominal pain.   Endocrine: Negative for cold intolerance and heat intolerance.   Genitourinary: Negative for flank pain.   Musculoskeletal: Negative for back pain.   Skin: Negative for rash.   Allergic/Immunologic: Negative for immunocompromised state.   Neurological: Negative for dizziness.   Psychiatric/Behavioral: Negative for agitation.      Physical Exam:    Blood pressure 126/84, pulse 90, resp. rate 17, height 5' 11\" (1.803 m), weight 106 kg (233 lb).    General/Constitutional: NAD, well developed, well nourished  HENT: Normocephalic, atraumatic  CV: Intact distal pulses, regular rate  Resp: No respiratory distress or labored breathing  Lymphatic: No lymphadenopathy palpated  Neuro: Alert and Oriented x 3, no focal deficits  Psych: Normal mood, normal affect, normal judgement, normal behavior  Skin: Warm, dry, no rashes, no erythema      Knee Exam (focused):  Visual inspection of the Right knee demonstrates normal contour without atrophy.   No previous incisions   There is no significant erythema or edema.    No significant joint effusion   Range of motion is full from 0-130 degrees of flexion   Able to straight leg raise   Not tender to palpation  Negative medial joint line tenderness, Negative  lateral joint line tenderness  Negative  medial Rose's, Negative  lateral Rose's  Stable  (1A) Lachman exam, Stable posterior drawer  Stable to varus and valgus stress at both 0 and 30°  Patella tracks normally.  No J sign.  No apprehension.  Translation is approximately 2 quadrants and is equal to the contralateral side  Patellar eversion is similar to the contralateral " side    Examination of the patient's ipsilateral hip demonstrates full painless range of motion.  No crepitus.      LE NV Exam: +2 DP/PT pulses bilaterally  Sensation intact to light touch L2-S1 bilaterally     Bilateral hip ROM demonstrates no pain actively or passively    No calf tenderness to palpation bilaterally    Knee Imaging    MRI of the right knee were reviewed, which demonstrate tricompartmental chondromalacia greatest in the patellofemoral compartment.  There is cystic change at the tibial insertion of the ACL however the ACL is intact.  No disruption of the tibial sided fibers.  Mid substance and femoral sided fibers are intact.  No evidence of ACL bone bruise pattern.  I agree with radiologist interpretation.      Scribe Attestation      I,:   am acting as a scribe while in the presence of the attending physician.:       I,:   personally performed the services described in this documentation    as scribed in my presence.:

## 2025-02-27 ENCOUNTER — APPOINTMENT (OUTPATIENT)
Dept: RADIOLOGY | Facility: CLINIC | Age: 45
End: 2025-02-27
Payer: COMMERCIAL

## 2025-02-27 ENCOUNTER — OFFICE VISIT (OUTPATIENT)
Dept: OBGYN CLINIC | Facility: CLINIC | Age: 45
End: 2025-02-27
Payer: COMMERCIAL

## 2025-02-27 VITALS — HEIGHT: 71 IN | WEIGHT: 213 LBS | BODY MASS INDEX: 29.82 KG/M2

## 2025-02-27 DIAGNOSIS — M75.42 IMPINGEMENT SYNDROME OF LEFT SHOULDER: Primary | ICD-10-CM

## 2025-02-27 DIAGNOSIS — M25.512 LEFT SHOULDER PAIN, UNSPECIFIED CHRONICITY: ICD-10-CM

## 2025-02-27 PROCEDURE — 73030 X-RAY EXAM OF SHOULDER: CPT

## 2025-02-27 PROCEDURE — 20610 DRAIN/INJ JOINT/BURSA W/O US: CPT | Performed by: STUDENT IN AN ORGANIZED HEALTH CARE EDUCATION/TRAINING PROGRAM

## 2025-02-27 PROCEDURE — 99213 OFFICE O/P EST LOW 20 MIN: CPT | Performed by: STUDENT IN AN ORGANIZED HEALTH CARE EDUCATION/TRAINING PROGRAM

## 2025-02-27 RX ORDER — TRIAMCINOLONE ACETONIDE 40 MG/ML
40 INJECTION, SUSPENSION INTRA-ARTICULAR; INTRAMUSCULAR
Status: COMPLETED | OUTPATIENT
Start: 2025-02-27 | End: 2025-02-27

## 2025-02-27 RX ORDER — METHYLPHENIDATE HYDROCHLORIDE 36 MG/1
36 TABLET, EXTENDED RELEASE ORAL
COMMUNITY

## 2025-02-27 RX ORDER — BUPIVACAINE HYDROCHLORIDE 2.5 MG/ML
4 INJECTION, SOLUTION INFILTRATION; PERINEURAL
Status: COMPLETED | OUTPATIENT
Start: 2025-02-27 | End: 2025-02-27

## 2025-02-27 RX ADMIN — TRIAMCINOLONE ACETONIDE 40 MG: 40 INJECTION, SUSPENSION INTRA-ARTICULAR; INTRAMUSCULAR at 09:30

## 2025-02-27 RX ADMIN — BUPIVACAINE HYDROCHLORIDE 4 ML: 2.5 INJECTION, SOLUTION INFILTRATION; PERINEURAL at 09:30

## 2025-02-27 NOTE — PROGRESS NOTES
"Ortho Sports Medicine Shoulder New Patient Visit     Assesment:   44 y.o. male left shoulder impingement syndrome    Plan:    Johnny is present in office for evaluation of his left shoulder pain. X-rays of the left shoulder obtained and reviewed with patient in depth. Discussed that has physical exam and symptoms of impingement syndrome. Discussed and recommended subacromial corticosteroid injection which was given without complication and was well tolerated. Discussed that if he does not have any improvement to call office and we give referral for formal physical therapy, in the meantime provided some fundamental shoulder exercises for him to do at home, he already has bands. Patient is to follow up as needed and can use OTC pain medication as needed.     Conservative treatment:    Ice to shoulder 1-2 times daily, for 20 minutes at a time.  PT for ROM and strengthening to shoulder, rotator cuff, scapular stabilizers.  Home exercise program for shoulder, including ROM and strenthening.  Instructions given to patient of what exercises to perform.      Imaging:    All imaging from today was reviewed by myself and explained to the patient.       Injection:    The risks and benefits of the injection (which include but are not limited to: infection, bleeding,damage to nerve/artery, need for further intervention), as well as the risks and benefits of all alternative treatments were explained and understood.  The patient elected to proceed with injection. The procedure was done with aseptic technique, and the patient tolerated the procedure well with no complications.  A corticosteroid injection of the subacromial space was performed.      Large joint arthrocentesis: L subacromial bursa  Universal Protocol:  Consent: Verbal consent obtained. Written consent not obtained.  Risks and benefits: risks, benefits and alternatives were discussed  Consent given by: patient  Time out: Immediately prior to procedure a \"time out\" was " called to verify the correct patient, procedure, equipment, support staff and site/side marked as required.  Timeout called at: 2/27/2025 10:15 AM.  Patient understanding: patient states understanding of the procedure being performed  Relevant documents: relevant documents present and verified  Test results: test results available and properly labeled  Site marked: the operative site was marked  Radiology Images displayed and confirmed. If images not available, report reviewed: imaging studies available  Patient identity confirmed: verbally with patient, provided demographic data and hospital-assigned identification number  Supporting Documentation  Indications: pain and joint swelling   Procedure Details  Location: shoulder - L subacromial bursa  Preparation: Patient was prepped and draped in the usual sterile fashion  Needle size: 22 G  Ultrasound guidance: no  Approach: posterior  Medications administered: 4 mL bupivacaine 0.25 %; 40 mg triamcinolone acetonide 40 mg/mL    Patient tolerance: patient tolerated the procedure well with no immediate complications  Dressing:  Sterile dressing applied         Surgery:     No surgery is recommended at this point, continue with conservative treatment plan as noted.      Follow up:    No follow-ups on file.        Chief Complaint   Patient presents with    Left Shoulder - Pain, Clicking       History of Present Illness:    The patient is a 44 y.o., right hand dominant male whose occupation is , seen in clinic for evaluation of left shoulder pain.  States that his shoulder has been painful for 3-3.5 weeks. He states that he was doing a lot of push ups and pull ups and noticed some pain anterior and at the top of his shoulder at distal clavicle. Patient states that he has been using OTC pain medication as needed since and has improved a little but notes some decreased range of motion with elbow bent at 90 degrees in external rotation. Pain is worsened with bench  pressing, push-up and pull ups, pain is rated to be a 4-5/10 dull achy pain that does not radiate. Denies any trauma or injury and denies any numbness or tingling. Since his shoulder pain he has started at home exercises with exercise bands, no formal PT and no corticosteroid injection.          Shoulder Surgical History:  None    Past Medical, Social and Family History:  Past Medical History:   Diagnosis Date    Allergic     Asthma     cold temperature and activity induced     Past Surgical History:   Procedure Laterality Date    ELBOW SURGERY Right     ulnar nerve relocated.     Allergies   Allergen Reactions    Mixed Ragweed Allergic Rhinitis    Molds & Smuts Allergic Rhinitis    Seasonal Ic [Octacosanol] Sneezing     Current Outpatient Medications on File Prior to Visit   Medication Sig Dispense Refill    albuterol (Proventil HFA) 90 mcg/act inhaler Inhale 2 puffs every 6 (six) hours as needed for wheezing 6.7 g 1    Azelastine HCl 137 MCG/SPRAY SOLN 1 spray by Intranasal route 2 (two) times a day 90 mL 1    Cetirizine HCl 10 MG CAPS Take by mouth      finasteride (PROPECIA) 1 MG tablet TAKE 1 TABLET BY MOUTH EVERY DAY 90 tablet 1    Methylphenidate HCl ER 36 MG TB24 Take 36 mg by mouth (Patient not taking: Reported on 2025)      Mometasone Furoate POWD Dissolve 1 mg in Neilmed Sinus rinse bottle and irrigate sinuses twice daily. 90 day supply (Patient not taking: Reported on 2025) 0.18 g 3     No current facility-administered medications on file prior to visit.     Social History     Socioeconomic History    Marital status: /Civil Union     Spouse name: Not on file    Number of children: Not on file    Years of education: Not on file    Highest education level: Not on file   Occupational History    Not on file   Tobacco Use    Smoking status: Former     Current packs/day: 0.00     Types: Cigarettes     Quit date:      Years since quittin.1    Smokeless tobacco: Never   Vaping Use     "Vaping status: Never Used   Substance and Sexual Activity    Alcohol use: Not Currently    Drug use: Not Currently    Sexual activity: Not on file   Other Topics Concern    Not on file   Social History Narrative    Not on file     Social Drivers of Health     Financial Resource Strain: Not on file   Food Insecurity: Not on file   Transportation Needs: Not on file   Physical Activity: Not on file   Stress: Not on file   Social Connections: Not on file   Intimate Partner Violence: Not on file   Housing Stability: Not on file         I have reviewed the past medical, surgical, social and family history, medications and allergies as documented in the EMR.    Review of systems: ROS is negative other than that noted in the HPI.  Constitutional: Negative for fatigue and fever.   HENT: Negative for sore throat.    Respiratory: Negative for shortness of breath.    Cardiovascular: Negative for chest pain.   Gastrointestinal: Negative for abdominal pain.   Endocrine: Negative for cold intolerance and heat intolerance.   Genitourinary: Negative for flank pain.   Musculoskeletal: Negative for back pain.   Skin: Negative for rash.   Allergic/Immunologic: Negative for immunocompromised state.   Neurological: Negative for dizziness.   Psychiatric/Behavioral: Negative for agitation.      Physical Exam:    Height 5' 11\" (1.803 m), weight 96.6 kg (213 lb).    General/Constitutional: NAD, well developed, well nourished  HENT: Normocephalic, atraumatic  CV: Intact distal pulses, regular rate  Resp: No respiratory distress or labored breathing  Lymphatic: No lymphadenopathy palpated  Neuro: Alert and Oriented x 3, no focal deficits  Psych: Normal mood, normal affect, normal judgement, normal behavior  Skin: Warm, dry, no rashes, no erythema      Shoulder focused exam:     Visual inspection of the shoulder demonstrates normal contour without atrophy  No evidence of scapular dyskinesia or atrophy.  No scapular winging  Active and passive " range of motion demonstrates forward flexion to 180, abduction to 90, external rotation is approximately 60 with the arm in 90° of abduction, external rotation is 45 with the arm the side, internal rotation to lumbar   Rotator cuff strength is 5/5 to resisted forward flexion, 5/5 resisted abduction, 5/5 resisted external rotation, 5/5 resisted internal rotation  Mild tenderness to palpation at the distal clavicle, No tenderness over the AC joint, acromion, or scapular spine  No pain with cross-body adduction  + Neer's test, + modified Aparicio impingement test  Negative external rotation lag sign  Negative belly press, negative lift-off  -speed's and Yergason's test  mild tenderness to palpation at the bicipital groove  + Glen's test        UE NV Exam: +2 Radial pulses bilaterally  Sensation intact to light touch C5-T1 bilaterally, Radial/median/ulnar nerve motor intact      Bilateral elbow, wrist, and and forearm ROM full, painless with passive ROM, no ttp or crepitance throughout extremities below shoulder joint    Cervical ROM is full without pain, numbness or tingling      Shoulder Imaging    X-rays of the left shoulder were reviewed, which demonstrate no acute osseous abnormality some mild AC joint osteoarthritis.  I  do not currently have a radiology reading from [facility], but will check the result once the reading is performed.        Scribe Attestation      I,:   am acting as a scribe while in the presence of the attending physician.:       I,:   personally performed the services described in this documentation    as scribed in my presence.:

## 2025-06-26 NOTE — LETTER
April 11, 2024     Conrad Norton MD  [address] [place] [id number]    Patient: Johnny Tellez   YOB: 1980   Date of Visit: 4/11/2024       Dear Dr. Norton:    Thank you for referring Johnny Tellez to me for evaluation. Below are my notes for this consultation.    If you have questions, please do not hesitate to call me. I look forward to following your patient along with you.         Sincerely,        Yao Pineda, DO        CC: Smith Wylie III, DO    Yao Pineda DO  4/11/2024 10:24 AM  Sign when Signing Visit  Ortho Sports Medicine Knee New Patient Visit     Assesment:   43 y.o. male right knee patellofemoral osteoarthritis.    Plan:  The patient's diagnosis and treatment were discussed at length today. We discussed no treatment, non-operative treatment, and operative treatment.      Johnny presents today with right knee chondromalacia.  Pathophysiology was discussed at length.  Corticosteroid injection provided and emphasis on continued posterior chain strengthening to avoid quad/patellofemoral overload.  Patient can follow-up as needed.  Low concern regarding cystic changes in the ACL insertion of the tibia as his ACL is intact/solid today and he has no complaints of knee instability.        Conservative treatment:    Ice to knee for 20 minutes at least 1-2 times daily.  OTC NSAIDS prn for pain.  Tylenol for pain.  Recommended patient avoid jumping activities that may make his pain worse.  Recommended patient gradually increase activity as tolerated.    Imaging:    All imaging from today was reviewed by myself and explained to the patient.       Injection:    The risks and benefits of the injection (which include but are not limited to: infection, bleeding,damage to nerve/artery, need for further intervention), as well as the risks and benefits of all alternative treatments were explained and understood.  The patient elected to proceed with injection.  The  procedure was done with aseptic technique, and the patient tolerated the procedure well with no complications.  A corticosteroid injection was performed.  The patient should take 1-2 days off of activity, with gradual return to activity as tolerated.  Ice to the knee 1-2 times daily for 20 minutes, for next 24-48 hrs.    Large joint arthrocentesis: R knee  Universal Protocol:  Consent: Verbal consent obtained.  Consent given by: patient  Timeout called at: 4/11/2024 8:51 AM.  Patient understanding: patient states understanding of the procedure being performed  Patient identity confirmed: verbally with patient  Supporting Documentation  Indications: pain   Procedure Details  Location: knee - R knee  Needle size: 22 G  Ultrasound guidance: no  Approach: anterolateral  Medications administered: 4 mL bupivacaine 0.25 %; 40 mg triamcinolone acetonide 40 mg/mL    Patient tolerance: patient tolerated the procedure well with no immediate complications          Surgery:     No surgery is recommended at this point, continue with conservative treatment plan as noted.      Follow up:    No follow-ups on file.        Chief Complaint   Patient presents with   • Right Knee - Pain       History of Present Illness:    The patient is a 43 y.o. male whose occupation is , referred to me by Dr. Wylie, seen in clinic for consultation of right knee pain.  The patient is very active mountain biking, running marathons, and doing crossfit.    Pain is located anterior.  The patient rates the pain as a 0/10. The pain is characterized as dull and achy. The pain has been present since November 2022. The patient reports a couple injuries to his knee including hyperextension when suspension bottomed out and hitting his knee. Pain is present with knee extension such as leg press with weight, standing on his bike with cycling. Pain is worth with cycling than running. Patient reports he treated his knee as patellofemoral syndrome with 6  months of PT and worked to adjust his cycling form. Patient modifies crossfit and lifts more upper body than lower body.     Pain is improved by rest, physical therapy, and ibuprofen.  Pain is aggravated by cycling and leg press or knee extensions with weight.  Patient denies any catching, clicking or locking in the knee.     The patient has tried rest, physical therapy, ibuprofen, and TROM.      Knee Surgical History:  None    Past Medical, Social and Family History:  Past Medical History:   Diagnosis Date   • Allergic    • Asthma     cold temperature and activity induced     Past Surgical History:   Procedure Laterality Date   • ELBOW SURGERY Right     ulnar nerve relocated.     Allergies   Allergen Reactions   • Mixed Ragweed Allergic Rhinitis   • Molds & Smuts Allergic Rhinitis   • Seasonal Ic [Octacosanol] Sneezing     Current Outpatient Medications on File Prior to Visit   Medication Sig Dispense Refill   • albuterol (Proventil HFA) 90 mcg/act inhaler Inhale 2 puffs every 6 (six) hours as needed for wheezing 6.7 g 1   • Azelastine HCl 137 MCG/SPRAY SOLN 1-2 SPRAYS INTO EACH NOSTRIL 2 TIMES A DAY AS NEEDED FOR RHINITIS USE IN EACH NOSTRIL AS DIRECTED 90 mL 3   • finasteride (PROPECIA) 1 MG tablet TAKE 1 TABLET BY MOUTH EVERY DAY 90 tablet 1   • Mometasone Furoate POWD Dissolve 1 mg in [facility] Sinus rinse bottle and irrigate sinuses twice daily. 90 day supply 0.18 g 3     No current facility-administered medications on file prior to visit.     Social History     Socioeconomic History   • Marital status: /Civil Union     Spouse name: Not on file   • Number of children: Not on file   • Years of education: Not on file   • Highest education level: Not on file   Occupational History   • Not on file   Tobacco Use   • Smoking status: Former     Current packs/day: 0.00     Types: Cigarettes     Quit date:      Years since quittin.2   • Smokeless tobacco: Never   Vaping Use   • Vaping status: Never Used  "  Substance and Sexual Activity   • Alcohol use: Not Currently   • Drug use: Not Currently   • Sexual activity: Not on file   Other Topics Concern   • Not on file   Social History Narrative   • Not on file     Social Determinants of Health     Financial Resource Strain: Not on file   Food Insecurity: Not on file   Transportation Needs: Not on file   Physical Activity: Not on file   Stress: Not on file   Social Connections: Not on file   Intimate Partner Violence: Not on file   Housing Stability: Not on file         I have reviewed the past medical, surgical, social and family history, medications and allergies as documented in the EMR.    Review of systems: ROS is negative other than that noted in the HPI.  Constitutional: Negative for fatigue and fever.   HENT: Negative for sore throat.    Respiratory: Negative for shortness of breath.    Cardiovascular: Negative for chest pain.   Gastrointestinal: Negative for abdominal pain.   Endocrine: Negative for cold intolerance and heat intolerance.   Genitourinary: Negative for flank pain.   Musculoskeletal: Negative for back pain.   Skin: Negative for rash.   Allergic/Immunologic: Negative for immunocompromised state.   Neurological: Negative for dizziness.   Psychiatric/Behavioral: Negative for agitation.      Physical Exam:    Blood pressure 126/84, pulse 90, resp. rate 17, height 5' 11\" (1.803 m), weight 106 kg (233 lb).    General/Constitutional: NAD, well developed, well nourished  HENT: Normocephalic, atraumatic  CV: Intact distal pulses, regular rate  Resp: No respiratory distress or labored breathing  Lymphatic: No lymphadenopathy palpated  Neuro: Alert and Oriented x 3, no focal deficits  Psych: Normal mood, normal affect, normal judgement, normal behavior  Skin: Warm, dry, no rashes, no erythema      Knee Exam (focused):  Visual inspection of the Right knee demonstrates normal contour without atrophy.   No previous incisions   There is no significant erythema or " [No Acute Distress] : no acute distress edema.    No significant joint effusion   Range of motion is full from 0-130 degrees of flexion   Able to straight leg raise   Not tender to palpation  Negative medial joint line tenderness, Negative  lateral joint line tenderness  Negative  medial Rose's, Negative  lateral Rose's  Stable  (1A) Lachman exam, Stable posterior drawer  Stable to varus and valgus stress at both 0 and 30°  Patella tracks normally.  No J sign.  No apprehension.  Translation is approximately 2 quadrants and is equal to the contralateral side  Patellar eversion is similar to the contralateral side    Examination of the patient's ipsilateral hip demonstrates full painless range of motion.  No crepitus.      LE NV Exam: +2 DP/PT pulses bilaterally  Sensation intact to light touch L2-S1 bilaterally     Bilateral hip ROM demonstrates no pain actively or passively    No calf tenderness to palpation bilaterally    Knee Imaging    MRI of the right knee were reviewed, which demonstrate tricompartmental chondromalacia greatest in the patellofemoral compartment.  There is cystic change at the tibial insertion of the ACL however the ACL is intact.  No disruption of the tibial sided fibers.  Mid substance and femoral sided fibers are intact.  No evidence of ACL bone bruise pattern.  I agree with radiologist interpretation.      Scribe Attestation      I,:   am acting as a scribe while in the presence of the attending physician.:       I,:   personally performed the services described in this documentation    as scribed in my presence.:            [Normal Oropharynx] : normal oropharynx [III] : Mallampati Class: III [Normal Appearance] : normal appearance [No Neck Mass] : no neck mass [Normal Rate/Rhythm] : normal rate/rhythm [Normal S1, S2] : normal s1, s2 [No Murmurs] : no murmurs [No Resp Distress] : no resp distress [Clear to Auscultation Bilaterally] : clear to auscultation bilaterally [No Abnormalities] : no abnormalities [Benign] : benign [Normal Gait] : normal gait [No Clubbing] : no clubbing [No Cyanosis] : no cyanosis [No Edema] : no edema [FROM] : FROM [Normal Color/ Pigmentation] : normal color/ pigmentation [No Focal Deficits] : no focal deficits [Oriented x3] : oriented x3 [Normal Affect] : normal affect [TextBox_2] : OW [TextBox_68] : I:E ratio 1:3; clear